# Patient Record
Sex: FEMALE | Race: WHITE | NOT HISPANIC OR LATINO | URBAN - METROPOLITAN AREA
[De-identification: names, ages, dates, MRNs, and addresses within clinical notes are randomized per-mention and may not be internally consistent; named-entity substitution may affect disease eponyms.]

---

## 2017-05-19 ENCOUNTER — OUTPATIENT (OUTPATIENT)
Dept: OUTPATIENT SERVICES | Facility: HOSPITAL | Age: 67
LOS: 1 days | Discharge: HOME | End: 2017-05-19

## 2017-06-28 DIAGNOSIS — S83.222A PERIPHERAL TEAR OF MEDIAL MENISCUS, CURRENT INJURY, LEFT KNEE, INITIAL ENCOUNTER: ICD-10-CM

## 2018-03-02 ENCOUNTER — INPATIENT (INPATIENT)
Facility: HOSPITAL | Age: 68
LOS: 4 days | Discharge: HOME | End: 2018-03-07
Attending: INTERNAL MEDICINE

## 2018-03-02 VITALS
RESPIRATION RATE: 20 BRPM | TEMPERATURE: 96 F | OXYGEN SATURATION: 96 % | HEART RATE: 90 BPM | SYSTOLIC BLOOD PRESSURE: 166 MMHG | DIASTOLIC BLOOD PRESSURE: 107 MMHG

## 2018-03-02 DIAGNOSIS — Z93.3 COLOSTOMY STATUS: Chronic | ICD-10-CM

## 2018-03-02 DIAGNOSIS — K94.09 OTHER COMPLICATIONS OF COLOSTOMY: Chronic | ICD-10-CM

## 2018-03-02 LAB
ALBUMIN SERPL ELPH-MCNC: 3.7 G/DL — SIGNIFICANT CHANGE UP (ref 3–5.5)
ALP SERPL-CCNC: 74 U/L — SIGNIFICANT CHANGE UP (ref 30–115)
ALT FLD-CCNC: 122 U/L — HIGH (ref 0–41)
ANION GAP SERPL CALC-SCNC: 10 MMOL/L — SIGNIFICANT CHANGE UP (ref 7–14)
AST SERPL-CCNC: 90 U/L — HIGH (ref 0–41)
B-TYPE NATRIURETIC PEPTIDE BNP RESULT: 980 PG/ML — HIGH (ref 0–99)
BASOPHILS # BLD AUTO: 0.01 K/UL — SIGNIFICANT CHANGE UP (ref 0–0.2)
BASOPHILS NFR BLD AUTO: 0.1 % — SIGNIFICANT CHANGE UP (ref 0–1)
BILIRUB SERPL-MCNC: 1.6 MG/DL — HIGH (ref 0.2–1.2)
BUN SERPL-MCNC: 24 MG/DL — HIGH (ref 10–20)
CALCIUM SERPL-MCNC: 9.2 MG/DL — SIGNIFICANT CHANGE UP (ref 8.5–10.1)
CHLORIDE SERPL-SCNC: 105 MMOL/L — SIGNIFICANT CHANGE UP (ref 98–110)
CK MB CFR SERPL CALC: 4.5 NG/ML — SIGNIFICANT CHANGE UP (ref 0.6–6.3)
CO2 SERPL-SCNC: 27 MMOL/L — SIGNIFICANT CHANGE UP (ref 17–32)
CREAT SERPL-MCNC: 0.8 MG/DL — SIGNIFICANT CHANGE UP (ref 0.7–1.5)
D DIMER BLD IA.RAPID-MCNC: 378 NG/ML DDU — HIGH (ref 0–230)
EOSINOPHIL # BLD AUTO: 0.02 K/UL — SIGNIFICANT CHANGE UP (ref 0–0.7)
EOSINOPHIL NFR BLD AUTO: 0.1 % — SIGNIFICANT CHANGE UP (ref 0–8)
GAS PNL BLDV: SIGNIFICANT CHANGE UP
GLUCOSE SERPL-MCNC: 123 MG/DL — HIGH (ref 70–110)
HCT VFR BLD CALC: 43.2 % — SIGNIFICANT CHANGE UP (ref 37–47)
HGB BLD-MCNC: 14.1 G/DL — SIGNIFICANT CHANGE UP (ref 12–16)
IMM GRANULOCYTES NFR BLD AUTO: 0.7 % — HIGH (ref 0.1–0.3)
LYMPHOCYTES # BLD AUTO: 1.95 K/UL — SIGNIFICANT CHANGE UP (ref 1.2–3.4)
LYMPHOCYTES # BLD AUTO: 13 % — LOW (ref 20.5–51.1)
MAGNESIUM SERPL-MCNC: 2 MG/DL — SIGNIFICANT CHANGE UP (ref 1.8–2.4)
MCHC RBC-ENTMCNC: 30 PG — SIGNIFICANT CHANGE UP (ref 27–31)
MCHC RBC-ENTMCNC: 32.6 G/DL — SIGNIFICANT CHANGE UP (ref 32–37)
MCV RBC AUTO: 91.9 FL — SIGNIFICANT CHANGE UP (ref 81–99)
MONOCYTES # BLD AUTO: 1.13 K/UL — HIGH (ref 0.1–0.6)
MONOCYTES NFR BLD AUTO: 7.5 % — SIGNIFICANT CHANGE UP (ref 1.7–9.3)
NEUTROPHILS # BLD AUTO: 11.79 K/UL — HIGH (ref 1.4–6.5)
NEUTROPHILS NFR BLD AUTO: 78.6 % — HIGH (ref 42.2–75.2)
PLATELET # BLD AUTO: 243 K/UL — SIGNIFICANT CHANGE UP (ref 130–400)
POTASSIUM SERPL-MCNC: 4.1 MMOL/L — SIGNIFICANT CHANGE UP (ref 3.5–5)
POTASSIUM SERPL-SCNC: 4.1 MMOL/L — SIGNIFICANT CHANGE UP (ref 3.5–5)
PROT SERPL-MCNC: 6.1 G/DL — SIGNIFICANT CHANGE UP (ref 6–8)
RBC # BLD: 4.7 M/UL — SIGNIFICANT CHANGE UP (ref 4.2–5.4)
RBC # FLD: 13.6 % — SIGNIFICANT CHANGE UP (ref 11.5–14.5)
SODIUM SERPL-SCNC: 142 MMOL/L — SIGNIFICANT CHANGE UP (ref 135–146)
TROPONIN I SERPL-MCNC: 0.03 NG/ML — SIGNIFICANT CHANGE UP (ref 0–0.05)
WBC # BLD: 15.01 K/UL — HIGH (ref 4.8–10.8)
WBC # FLD AUTO: 15.01 K/UL — HIGH (ref 4.8–10.8)

## 2018-03-02 RX ORDER — FUROSEMIDE 40 MG
20 TABLET ORAL ONCE
Qty: 0 | Refills: 0 | Status: COMPLETED | OUTPATIENT
Start: 2018-03-02 | End: 2018-03-02

## 2018-03-02 RX ORDER — ENOXAPARIN SODIUM 100 MG/ML
60 INJECTION SUBCUTANEOUS ONCE
Qty: 0 | Refills: 0 | Status: COMPLETED | OUTPATIENT
Start: 2018-03-02 | End: 2018-03-02

## 2018-03-02 RX ORDER — FUROSEMIDE 40 MG
40 TABLET ORAL DAILY
Qty: 0 | Refills: 0 | Status: DISCONTINUED | OUTPATIENT
Start: 2018-03-02 | End: 2018-03-03

## 2018-03-02 RX ORDER — FUROSEMIDE 40 MG
40 TABLET ORAL ONCE
Qty: 0 | Refills: 0 | Status: COMPLETED | OUTPATIENT
Start: 2018-03-02 | End: 2018-03-02

## 2018-03-02 RX ADMIN — Medication 40 MILLIGRAM(S): at 17:40

## 2018-03-02 RX ADMIN — ENOXAPARIN SODIUM 60 MILLIGRAM(S): 100 INJECTION SUBCUTANEOUS at 21:58

## 2018-03-02 RX ADMIN — Medication 20 MILLIGRAM(S): at 17:07

## 2018-03-02 RX ADMIN — Medication 2.5 MILLIGRAM(S): at 21:58

## 2018-03-02 NOTE — ED PROVIDER NOTE - MEDICAL DECISION MAKING DETAILS
see attestation.  Patient in new onset A Fib with CHF.  Appropriate labs sent, will start diuresis.  Patient will require tele admission for new onset A Fib and CHF with pulmonary edema.

## 2018-03-02 NOTE — ED PROVIDER NOTE - NS ED ROS FT
Constitutional: See HPI.  Eyes: No visual changes, eye pain or discharge.  ENMT: No hearing changes, pain, discharge or infections. No neck pain or stiffness.  Cardiac: No chest pain, No chest pain with exertion.  Respiratory: No cough or respiratory distress.   GI: No nausea, vomiting, diarrhea or abdominal pain.  : No dysuria, frequency or burning.  MS: No myalgia, muscle weakness, joint pain or back pain.  Neuro: No headache or weakness. No LOC.  Skin: No skin rash.

## 2018-03-02 NOTE — H&P ADULT - ASSESSMENT
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- last dose of prednisone today    4) HCM  dvt ppx on therapeutic lovenox  diet- low na diet  dispo- home 67y f with pmh of perforated diverticulitis s/p hartmans s/p reversal, galileo galileo disease p/w leg swelling and abd pain was found to have newly diagnosed afib and new onset chf    1) newly diagnosed afib-   admit to telemetry  rate control-give cardizem 15mg iv and then start cardizem 30mg q6h  chadsvasc-4, needs anticoagulation, will start lovenox for now  cardiology c/s    2) new onset chf-   c/w lasix 20mg iv  monitor i&o, daily weights  check ce x2 and echo  repeat cxr in am  add acei    3) 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- last dose of prednisone today    4) HCM  dvt ppx on therapeutic lovenox  diet- low na diet  dispo- home 67y f with pmh of perforated diverticulitis s/p hartmans s/p reversal, galileo galileo disease p/w leg swelling and abd pain was found to have newly diagnosed afib and new onset chf    1) newly diagnosed afib-   admit to telemetry  rate control-give cardizem 15mg iv and then start cardizem 30mg q6h  chadsvasc-4, needs anticoagulation, will start lovenox for now  cardiology c/s    2) new onset chf-   c/w lasix 20mg iv  monitor i&o, daily weights  check ce x2 and echo  repeat cxr in am  add acei    3) 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

## 2018-03-02 NOTE — ED PROVIDER NOTE - OBJECTIVE STATEMENT
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 Patient describes feeling unwell for the past 1 week since starting cephalexin for skin rash. Today palpations started prompting her to come into ED. Denies fevers, chills, cough, headache. Associated with leg swelling.

## 2018-03-02 NOTE — H&P ADULT - ATTENDING COMMENTS
seen and examined independently. agree with the above resident's note except as documented below.     67 yr old female with h/o galileo-galileo disease, hartmans procedure p/w 1 week h/o leg swelling, intermittent epigastric pressure type pain and palpitations after she was started on keflex and prednisone for skin rash. denies chest pain/fever/chills/vomiting/diarrhea. pt also c/o orthopnea. pt was found to have new onset a.flutter and chf in ED. currently pt is asymptomatic. on exam, +bibasilar crackles, s1s2 irregular tachy, no LE edema, abd soft non tender, blood work and imaging reviewed. unable to find ekg in chart. On telemetry-pt in a.flutter with variable block with vent rate around 110.  A: new onset  CHF and Atrial flutter (chadsvasc-3)     h/o galileo-galileo disease  P: cont. cardizem for rate control, iv lasix, monitor Is &Os, check TTE, thyroid profile, tele monitoring, repeat Ce and ECG, therapeutic lovenox.    cardiology consult. seen and examined independently. agree with the above resident's note except as documented below.     67 yr old female with h/o galileo-galileo disease, hartmans procedure p/w 1 week h/o leg swelling, intermittent epigastric pressure type pain and palpitations after she was started on keflex and prednisone for skin rash. denies chest pain/fever/chills/vomiting/diarrhea. pt also c/o orthopnea. pt was found to have new onset a.flutter and chf in ED. currently pt is asymptomatic. on exam, +bibasilar crackles, s1s2 irregular tachy, no LE edema, abd soft non tender. blood work and imaging reviewed. unable to find ekg in chart. On telemetry-pt in a.flutter with variable block with ventr. rate around 110.  A: new onset  CHF and Atrial flutter (chadsvasc-3)     h/o galileo-galileo disease  P: cont. cardizem for rate control, iv lasix, monitor Is &Os, check TTE, thyroid profile, tele monitoring, repeat Ce and ECG, therapeutic lovenox. cardiology consult.

## 2018-03-02 NOTE — H&P ADULT - NSHPREVIEWOFSYSTEMS_GEN_ALL_CORE
REVIEW OF SYSTEMS:    CONSTITUTIONAL: No weakness, fevers or chills  EYES/ENT: No visual changes;  No vertigo or throat pain   NECK: No pain or stiffness  RESPIRATORY: see hpi  CARDIOVASCULAR: see hpi  GASTROINTESTINAL: No abdominal or epigastric pain. No nausea, vomiting, or hematemesis; No diarrhea or constipation. No melena or hematochezia.  GENITOURINARY: No dysuria, frequency or hematuria  NEUROLOGICAL: No numbness or weakness  SKIN: No itching, rashes REVIEW OF SYSTEMS:    CONSTITUTIONAL: No weakness, fevers or chills  EYES/ENT: No visual changes;  No vertigo or throat pain   NECK: No pain or stiffness  RESPIRATORY: see hpi  CARDIOVASCULAR: see hpi  GASTROINTESTINAL: No nausea, vomiting, or hematemesis; No diarrhea or constipation. No melena or hematochezia.  GENITOURINARY: No dysuria, frequency or hematuria  NEUROLOGICAL: No numbness or weakness  SKIN: see hpi

## 2018-03-02 NOTE — H&P ADULT - HISTORY OF PRESENT ILLNESS
67 y f with pmh of perforated diverticulitis s/p quick procedure and reversal in 2010 came in with abd pain and leg swelling. 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 On 2nd day, she started noticing leg swelling and stopped taking keflex. she also c/o abd pain which is pressure like, non radiating, intermittent along with palpitations, orthopnea but denies any cough, sob, n/v, change in bowel or urinary habits, fever, chills, weakness. In ED, she was found to be newly diagnosed afib and was given cardizem 15mg iv and 30mg po along with lasix 20mg iv. As per pt, today is her last dose of prednisone 5mg.

## 2018-03-02 NOTE — H&P ADULT - NSHPPHYSICALEXAM_GEN_ALL_CORE
PHYSICAL EXAM:      Constitutional: well developed and well nourished    Respiratory: b/l bases crackles    Cardiovascular: S1S2 irregular, tachycardic, no murmur heard    Gastrointestinal: Abd soft, non distended, non tender, BS+, midline scar    Extremities: 2+ pedal edema b/l    Neurological: AAOX3, No FND PHYSICAL EXAM:  aaox3    Constitutional: well developed and well nourished    Respiratory: b/l bases crackles    Cardiovascular: S1S2 irregular, tachycardic, no murmur heard    Gastrointestinal: Abd soft, non distended, non tender, BS+, midline scar    Extremities: 2+ pedal edema b/l    Neurological: AAOX3, No FND    neck: supple    HEENT: no pallor

## 2018-03-02 NOTE — ED PROVIDER NOTE - PHYSICAL EXAMINATION
AOx4, Non toxic appearing, NAD, speaking in full sentences. Skin  warm and dry, no acute rash. Head normocephalic, atraumatic. PERRLA/EOMI, conjunctiva and sclera clear. MM moist, no nasal discharge.  Pharynx and TM's unremarkable.  No mastoid or temporal ttp. Neck supple nt, no meningeal signs. Heart RRR s1s2 nl, no rub/murmur. Lungs- No retractions, crackles b/l in lower lung fields. Abdomen soft ntnd no r/g. Extremities- moves all, +equal distal pulses, brisk cap refill, sensation wnl, normal ROM. +LE edema,

## 2018-03-02 NOTE — ED PROVIDER NOTE - ATTENDING CONTRIBUTION TO CARE
Patient presents with palpitations and strange feeling in her epigastrium.  This started days ago after starting Keflex.  Patient in A Flutter with RVR. Patient has crackles to b/l bases and peripheral pitting edema.  No prior cardiac history. Rate Control given.  Patient CHADS2-Vasc considered.  Will check labs including D-dimer, CXR, reassess.  Patient will require anticoagulation and admission.

## 2018-03-03 LAB
CK MB BLD-MCNC: 5 % — HIGH (ref 0–4)
CK MB CFR SERPL CALC: 2.7 NG/ML — SIGNIFICANT CHANGE UP (ref 0.6–6.3)
CK MB CFR SERPL CALC: 2.8 NG/ML — SIGNIFICANT CHANGE UP (ref 0.6–6.3)
CK SERPL-CCNC: 53 U/L — SIGNIFICANT CHANGE UP (ref 0–225)
CK SERPL-CCNC: 56 U/L — SIGNIFICANT CHANGE UP (ref 0–225)
T3 SERPL-MCNC: 103 NG/DL — SIGNIFICANT CHANGE UP (ref 80–200)
T4 AB SER-ACNC: 8.1 UG/DL — SIGNIFICANT CHANGE UP (ref 4.6–12)
TROPONIN I SERPL-MCNC: 0.04 NG/ML — SIGNIFICANT CHANGE UP (ref 0–0.05)
TROPONIN I SERPL-MCNC: 0.04 NG/ML — SIGNIFICANT CHANGE UP (ref 0–0.05)
TSH SERPL-MCNC: 4.96 UIU/ML — HIGH (ref 0.27–4.2)

## 2018-03-03 RX ORDER — FUROSEMIDE 40 MG
40 TABLET ORAL DAILY
Qty: 0 | Refills: 0 | Status: DISCONTINUED | OUTPATIENT
Start: 2018-03-03 | End: 2018-03-07

## 2018-03-03 RX ORDER — APIXABAN 2.5 MG/1
2.5 TABLET, FILM COATED ORAL EVERY 12 HOURS
Qty: 0 | Refills: 0 | Status: DISCONTINUED | OUTPATIENT
Start: 2018-03-03 | End: 2018-03-03

## 2018-03-03 RX ORDER — ENOXAPARIN SODIUM 100 MG/ML
60 INJECTION SUBCUTANEOUS
Qty: 0 | Refills: 0 | Status: DISCONTINUED | OUTPATIENT
Start: 2018-03-03 | End: 2018-03-03

## 2018-03-03 RX ORDER — APIXABAN 2.5 MG/1
5 TABLET, FILM COATED ORAL EVERY 12 HOURS
Qty: 0 | Refills: 0 | Status: DISCONTINUED | OUTPATIENT
Start: 2018-03-03 | End: 2018-03-07

## 2018-03-03 RX ORDER — ENOXAPARIN SODIUM 100 MG/ML
60 INJECTION SUBCUTANEOUS EVERY 12 HOURS
Qty: 0 | Refills: 0 | Status: DISCONTINUED | OUTPATIENT
Start: 2018-03-03 | End: 2018-03-03

## 2018-03-03 RX ADMIN — Medication 2.5 MILLIGRAM(S): at 06:00

## 2018-03-03 RX ADMIN — APIXABAN 5 MILLIGRAM(S): 2.5 TABLET, FILM COATED ORAL at 18:12

## 2018-03-03 RX ADMIN — Medication 40 MILLIGRAM(S): at 05:59

## 2018-03-03 NOTE — CONSULT NOTE ADULT - ASSESSMENT
68 y/o F was referred from Urgent care for A Fib on EKG. Patient with palpitation for 3 days    Assessment:    1.	Ne onset Uncontrolled A Fib  2.	Acute CHF mostly diastolic secondary to uncontrolled A fib    Plan:    1.	Increase Diltiazem to 60mg PO q6h  2.	Change Lovenox to Eliquis   3.	d/c Enalapril  4.	Change Lasix IV to 40mg PO daily  5.	check thyroid function  6.	OUMOU & Cardioversion on Monday 66 y/o F was referred from Urgent care for A Fib on EKG. Patient with palpitation for 3 days    Assessment:    1.	Ne onset Uncontrolled A Fib  2.	Acute CHF mostly diastolic secondary to uncontrolled A fib    Plan:    1.	Increase Diltiazem to 60mg PO q6h  2.	Change Lovenox to Eliquis   3.	Cont Enalapril  4.	Change Lasix IV to 40mg PO daily  5.	check thyroid function  6.	OUMOU & Cardioversion on Monday

## 2018-03-03 NOTE — PROGRESS NOTE ADULT - SUBJECTIVE AND OBJECTIVE BOX
SUBJECTIVE:    Patient is a 67y old Female who presents with a chief complaint of Afib with RVR and new onset CHF (02 Mar 2018 20:03)    Currently admitted to medicine with the primary diagnosis of Atrial fibrillation with RVR     Today is hospital day 1d. Stable overnight. HR fluctuates with activity/laying down.      PAST MEDICAL & SURGICAL HISTORY  Perforated diverticulum  Retraction of colostomy  Status post Claritza procedure    SOCIAL HISTORY:  Negative for smoking/alcohol/drug use.     ALLERGIES:  No Known Allergies    MEDICATIONS:  STANDING MEDICATIONS  apixaban 5 milliGRAM(s) Oral every 12 hours  diltiazem    Tablet 60 milliGRAM(s) Oral every 6 hours  enalapril 2.5 milliGRAM(s) Oral daily  furosemide   Injectable 40 milliGRAM(s) IV Push daily    PRN MEDICATIONS    VITALS:   T(F): 97.1  HR: 152  BP: 138/90  RR: 18  SpO2: 97%    LABS:                        14.1   15.01 )-----------( 243      ( 02 Mar 2018 14:45 )             43.2     03-02    142  |  105  |  24<H>  ----------------------------<  123<H>  4.1   |  27  |  0.8    Ca    9.2      02 Mar 2018 14:45  Mg     2.0     03-02    TPro  6.1  /  Alb  3.7  /  TBili  1.6<H>  /  DBili  x   /  AST  90<H>  /  ALT  122<H>  /  AlkPhos  74  03-02          Troponin I, Serum: 0.04 ng/mL (03-03-18 @ 07:21)  Creatine Kinase, Serum: 53 U/L (03-03-18 @ 02:05)  Troponin I, Serum: 0.04 ng/mL (03-03-18 @ 02:05)  Troponin I, Serum: 0.03 ng/mL (03-02-18 @ 14:45)      CARDIAC MARKERS ( 03 Mar 2018 07:21 )  0.04 ng/mL / x     / x     / x     / 2.7 ng/mL  CARDIAC MARKERS ( 03 Mar 2018 02:05 )  0.04 ng/mL / x     / 53 U/L / x     / 2.8 ng/mL  CARDIAC MARKERS ( 02 Mar 2018 14:45 )  0.03 ng/mL / x     / x     / x     / 4.5 ng/mL      RADIOLOGY:  < from: CT Chest w/ IV Cont (03.02.18 @ 17:27) >    IMPRESSION:    Limited exam given history motion. No central pulmonary embolus.    Bilateral, right greater than left pleural effusions correlating with   chest radiograph findings from the same day.    < end of copied text >    PHYSICAL EXAM:  GEN:   LUNGS:    HEART:   ABD:   EXT:   NEURO: SUBJECTIVE:    Patient is a 67y old Female who presents with a chief complaint of Afib with RVR and new onset CHF (02 Mar 2018 20:03)    Currently admitted to medicine with the primary diagnosis of Atrial fibrillation with RVR     Today is hospital day 1d. Stable overnight. HR fluctuates with activity/laying down. Feels much better.     PAST MEDICAL & SURGICAL HISTORY  Perforated diverticulum  Retraction of colostomy  Status post Claritza procedure    SOCIAL HISTORY:  Negative for smoking/alcohol/drug use.     ALLERGIES:  No Known Allergies    MEDICATIONS:  STANDING MEDICATIONS  apixaban 5 milliGRAM(s) Oral every 12 hours  diltiazem    Tablet 60 milliGRAM(s) Oral every 6 hours  enalapril 2.5 milliGRAM(s) Oral daily  furosemide   Injectable 40 milliGRAM(s) IV Push daily    PRN MEDICATIONS    VITALS:   T(F): 97.1  HR: 152  BP: 138/90  RR: 18  SpO2: 97%    LABS:                        14.1   15.01 )-----------( 243      ( 02 Mar 2018 14:45 )             43.2     03-02    142  |  105  |  24<H>  ----------------------------<  123<H>  4.1   |  27  |  0.8    Ca    9.2      02 Mar 2018 14:45  Mg     2.0     03-02    TPro  6.1  /  Alb  3.7  /  TBili  1.6<H>  /  DBili  x   /  AST  90<H>  /  ALT  122<H>  /  AlkPhos  74  03-02          Troponin I, Serum: 0.04 ng/mL (03-03-18 @ 07:21)  Creatine Kinase, Serum: 53 U/L (03-03-18 @ 02:05)  Troponin I, Serum: 0.04 ng/mL (03-03-18 @ 02:05)  Troponin I, Serum: 0.03 ng/mL (03-02-18 @ 14:45)      CARDIAC MARKERS ( 03 Mar 2018 07:21 )  0.04 ng/mL / x     / x     / x     / 2.7 ng/mL  CARDIAC MARKERS ( 03 Mar 2018 02:05 )  0.04 ng/mL / x     / 53 U/L / x     / 2.8 ng/mL  CARDIAC MARKERS ( 02 Mar 2018 14:45 )  0.03 ng/mL / x     / x     / x     / 4.5 ng/mL      RADIOLOGY:  < from: CT Chest w/ IV Cont (03.02.18 @ 17:27) >    IMPRESSION:    Limited exam given history motion. No central pulmonary embolus.    Bilateral, right greater than left pleural effusions correlating with   chest radiograph findings from the same day.    < end of copied text >    PHYSICAL EXAM:  GEN: no acute distress, son at bedside  LUNGS: bibasilar crackles, no wheeze   HEART: rapid, irregular,   ABD: soft, non tender, +BS  EXT: no edema, B/L pulses present  NEURO: AOX3

## 2018-03-03 NOTE — CONSULT NOTE ADULT - SUBJECTIVE AND OBJECTIVE BOX
Cardiology Consultation    CHIEF COMPLAINT: Patient is a 67y old  Female who presents with a chief complaint of Afib with RVR and new onset CHF (02 Mar 2018 20:03)      HPI:  67 y f with pmh of perforated diverticulitis s/p quick procedure and reversal in 2010 came in with abd pain and leg swelling. 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 On 2nd day, she started noticing leg swelling and stopped taking keflex. she also c/o abd pain which is pressure like, non radiating, intermittent along with palpitations, orthopnea but denies any cough, sob, n/v, change in bowel or urinary habits, fever, chills, weakness. In ED, she was found to be newly diagnosed afib and was given cardizem 15mg iv and 30mg po along with lasix 20mg iv. As per pt, today is her last dose of prednisone 5mg. (02 Mar 2018 19:15)      PAST MEDICAL & SURGICAL HISTORY:  Perforated diverticulum  Retraction of colostomy  Status post Claritza procedure      SOCIAL HISTORY: Non smoker, no etoh or drug abuse    FAMILY HISTORY: FAMILY HISTORY:  Family history of cancer (Father)      Home Medications:  predniSONE 5 mg oral tablet: 1 tab(s) orally once a day (02 Mar 2018 19:27)      MEDICATIONS  (STANDING):  diltiazem    Tablet 30 milliGRAM(s) Oral every 6 hours  enalapril 2.5 milliGRAM(s) Oral daily  enoxaparin Injectable 60 milliGRAM(s) SubCutaneous every 12 hours  furosemide   Injectable 40 milliGRAM(s) IV Push daily    MEDICATIONS  (PRN):      Allergies    No Known Allergies      REVIEW OF SYSTEMS:    All other review of systems is negative unless indicated above    VITAL SIGNS:   Vital Signs Last 24 Hrs  T(C): 35.7 (02 Mar 2018 21:08), Max: 36.2 (02 Mar 2018 14:51)  T(F): 96.2 (02 Mar 2018 21:08), Max: 97.1 (02 Mar 2018 14:51)  HR: 130 (02 Mar 2018 21:08) (90 - 136)  BP: 146/94 (02 Mar 2018 21:08) (146/94 - 177/115)  BP(mean): --  RR: 16 (02 Mar 2018 21:08) (16 - 20)  SpO2: 97% (03 Mar 2018 02:52) (95% - 97%)    I&O's Summary      PHYSICAL EXAM:    Constitutional: NAD, awake and alert, well-developed  Pulmonary: Non-labored, breath sounds are clear bilaterally, No wheezing, rales or rhonchi  Cardiovascular: PMI not palpable non-displaced Regular S1 and S2, no murmurs, rubs, gallops or clicks  Gastrointestinal: Bowel Sounds present, soft, nontender.   Neurological: Alert, no focal deficits  LE: no edema      LABS: All Labs Reviewed:                        14.1   15.01 )-----------( 243      ( 02 Mar 2018 14:45 )             43.2     02 Mar 2018 14:45    142    |  105    |  24     ----------------------------<  123    4.1     |  27     |  0.8      Ca    9.2        02 Mar 2018 14:45  Mg     2.0       02 Mar 2018 14:45    TPro  6.1    /  Alb  3.7    /  TBili  1.6    /  DBili  x      /  AST  90     /  ALT  122    /  AlkPhos  74     02 Mar 2018 14:45      CARDIAC MARKERS ( 03 Mar 2018 02:05 )  0.04 ng/mL / x     / 53 U/L / x     / 2.8 ng/mL  CARDIAC MARKERS ( 02 Mar 2018 14:45 )  0.03 ng/mL / x     / x     / x     / 4.5 ng/mL            RADIOLOGY/EKG:  < from: CT Chest w/ IV Cont (03.02.18 @ 17:27) >  Limited exam given history motion. No central pulmonary embolus.    Bilateral, right greater than left pleural effusions correlating with   chest radiograph findings from the same day.    < end of copied text >

## 2018-03-03 NOTE — PROGRESS NOTE ADULT - ASSESSMENT
67y f with pmh of perforated diverticulitis s/p hartmans s/p reversal, galileo galileo disease p/w leg swelling and abd pain was found to have newly diagnosed afib and new onset chf    1) New onset a. fibrillation--CHADSVASC 3  -continue telemetry   Cardiology: started on cardizem 30q6h, adjusted to 60q6h as pt was not controlled  Anti-coagulation: initially started on lovenox, switched to eliquis 5mg BID   Thyroid studies pending     2) new onset CHF-   Initially on lasix 40mg IV-- adjust to 40mg PO as per cardiology  i&o, daily weights   CE negative   CXR: Findings suspicious for CHF. 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 fibrillation--CHADSVASC 3  -continue telemetry   Cardiology: started on cardizem 30q6h, adjusted to 60q6h as pt was not controlled  Anti-coagulation: initially started on lovenox, switched to eliquis 5mg BID   Thyroid studies pending   Possible cardioversion on Monday     2) new onset CHF-   Initially on lasix 40mg IV-- adjust to 40mg PO as per cardiology  i&o, daily weights   CE negative   CXR: Findings suspicious for CHF. 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 Pt does not require daily prednisone.     4) leukocytosis- patient just completed prednisone for skin disease    5) HCM  dvt ppx on eliquis   Low Na diet   dispo- home

## 2018-03-03 NOTE — CHART NOTE - NSCHARTNOTEFT_GEN_A_CORE
Patient is new onset A.fib. Patient HR is running in 90 when sleeping, and however goes up to 140-160 when awake.   Spoke with Cardio fellow, recommends to increased cardizem 30mg q6h to cardizem 60mg q6h. Patient is new onset A.fib. Patient HR is running in 90 when sleeping, and however goes up to 140-160 when awake.   Patient is asymptomatic during A.fib RVR, with stable BP.   Spoke with Cardio fellow, recommends to increased cardizem 30mg q6h to cardizem 60mg q6h.

## 2018-03-04 LAB
ANION GAP SERPL CALC-SCNC: 4 MMOL/L — LOW (ref 7–14)
BUN SERPL-MCNC: 12 MG/DL — SIGNIFICANT CHANGE UP (ref 10–20)
CALCIUM SERPL-MCNC: 8.8 MG/DL — SIGNIFICANT CHANGE UP (ref 8.5–10.1)
CHLORIDE SERPL-SCNC: 104 MMOL/L — SIGNIFICANT CHANGE UP (ref 98–110)
CO2 SERPL-SCNC: 33 MMOL/L — HIGH (ref 17–32)
CREAT SERPL-MCNC: 0.9 MG/DL — SIGNIFICANT CHANGE UP (ref 0.7–1.5)
GLUCOSE SERPL-MCNC: 89 MG/DL — SIGNIFICANT CHANGE UP (ref 70–110)
HCT VFR BLD CALC: 40 % — SIGNIFICANT CHANGE UP (ref 37–47)
HGB BLD-MCNC: 13.4 G/DL — SIGNIFICANT CHANGE UP (ref 12–16)
MCHC RBC-ENTMCNC: 30.5 PG — SIGNIFICANT CHANGE UP (ref 27–31)
MCHC RBC-ENTMCNC: 33.5 G/DL — SIGNIFICANT CHANGE UP (ref 32–37)
MCV RBC AUTO: 91.1 FL — SIGNIFICANT CHANGE UP (ref 81–99)
NRBC # BLD: 0 /100 WBCS — SIGNIFICANT CHANGE UP (ref 0–0)
PLATELET # BLD AUTO: 196 K/UL — SIGNIFICANT CHANGE UP (ref 130–400)
POTASSIUM SERPL-MCNC: 3.9 MMOL/L — SIGNIFICANT CHANGE UP (ref 3.5–5)
POTASSIUM SERPL-SCNC: 3.9 MMOL/L — SIGNIFICANT CHANGE UP (ref 3.5–5)
RBC # BLD: 4.39 M/UL — SIGNIFICANT CHANGE UP (ref 4.2–5.4)
RBC # FLD: 13.2 % — SIGNIFICANT CHANGE UP (ref 11.5–14.5)
SODIUM SERPL-SCNC: 141 MMOL/L — SIGNIFICANT CHANGE UP (ref 135–146)
WBC # BLD: 9.04 K/UL — SIGNIFICANT CHANGE UP (ref 4.8–10.8)
WBC # FLD AUTO: 9.04 K/UL — SIGNIFICANT CHANGE UP (ref 4.8–10.8)

## 2018-03-04 RX ADMIN — APIXABAN 5 MILLIGRAM(S): 2.5 TABLET, FILM COATED ORAL at 18:27

## 2018-03-04 RX ADMIN — Medication 40 MILLIGRAM(S): at 06:04

## 2018-03-04 RX ADMIN — APIXABAN 5 MILLIGRAM(S): 2.5 TABLET, FILM COATED ORAL at 06:03

## 2018-03-04 NOTE — PROGRESS NOTE ADULT - ASSESSMENT
67y f with pmh of perforated diverticulitis s/p hartmans s/p reversal, galileo galileo disease p/w leg swelling and abd pain was found to have newly diagnosed afib and new onset chf    1) New onset a. fibrillation--CHADSVASC 3  -continue telemetry   Cardiology: started on cardizem 30q6h, adjusted to 60q6h as pt was not controlled  Anti-coagulation: initially started on lovenox, switched to eliquis 5mg BID   Thyroid studies : TSH 4.96  Possible cardioversion on Monday     2) new onset CHF ? Diastolic Vs Systolic   Initially on lasix 40mg IV-- adjust to 40mg PO as per cardiology  i&o, daily weights   CE negative   CXR: Findings suspicious for CHF. 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 Pt does not require daily prednisone.     4) leukocytosis- patient just completed prednisone for skin disease    5) HCM  dvt ppx on eliquis   Low Na diet   dispo- home

## 2018-03-04 NOTE — PROGRESS NOTE ADULT - SUBJECTIVE AND OBJECTIVE BOX
INTERVAL HISTORY: No overnight events.   still in a.fib, for david/cv tomorrow  echo still pending  	  MEDICATIONS:  apixaban 5 milliGRAM(s) Oral every 12 hours  diltiazem    Tablet 60 milliGRAM(s) Oral every 6 hours  furosemide    Tablet 40 milliGRAM(s) Oral daily        PHYSICAL EXAM:  T(C): 37.3 (03-04-18 @ 12:47), Max: 37.3 (03-04-18 @ 12:47)  HR: 95 (03-04-18 @ 12:47) (95 - 108)  BP: 151/92 (03-04-18 @ 12:47) (123/84 - 151/92)  RR: 18 (03-04-18 @ 12:47) (18 - 18)  SpO2: --  Wt(kg): --  I&O's Summary        GENERAL: NAD, well-groomed, well-developed  HEAD:  Atraumatic, Normocephalic  NECK: Supple, No JVD, Normal thyroid  NERVOUS SYSTEM:  Alert & Oriented X3, Good concentration  CHEST/LUNG: Clear to percussion bilaterally; No rales, rhonchi, wheezing, or rubs  HEART: Irregular, 2/6 syst m+, no rubs, or gallops  ABDOMEN: Soft, Nontender, Nondistended; Bowel sounds present  EXTREMITIES:  No clubbing, cyanosis, or edema  SKIN: No rashes or lesions    LABS:	 	    CARDIAC MARKERS ( 03 Mar 2018 07:21 )  0.04 ng/mL / x     / 56 U/L / x     / 2.7 ng/mL  CARDIAC MARKERS ( 03 Mar 2018 02:05 )  0.04 ng/mL / x     / 53 U/L / x     / 2.8 ng/mL                            13.4   9.04  )-----------( 196      ( 04 Mar 2018 06:03 )             40.0     03-04    141  |  104  |  12  ----------------------------<  89  3.9   |  33<H>  |  0.9    Ca    8.8      04 Mar 2018 06:03      A/P  persistent a.fib  chf    cont meds  increase ccb for better rate control and bp mx  keep npo for david/cv zander  obtain 2d echo

## 2018-03-04 NOTE — PROGRESS NOTE ADULT - SUBJECTIVE AND OBJECTIVE BOX
KARAN LEMONS  67y  Female      Patient is a 67y old  Female who presents with a chief complaint of Afib with RVR and new onset CHF (02 Mar 2018 20:03)      INTERVAL HPI/OVERNIGHT EVENTS:      ******************************* REVIEW OF SYSTEMS:**********************************************    CONSTITUTIONAL: No fever, weight loss, or fatigue  RESPIRATORY: No cough, wheezing, chills or hemoptysis; No shortness of breath  CARDIOVASCULAR: No chest pain, palpitations, dizziness, or leg swelling  GASTROINTESTINAL: No abdominal or epigastric pain. No nausea, vomiting, or hematemesis; No diarrhea or constipation. No melena or hematochezia.  GENITOURINARY: No dysuria, frequency, hematuria, or incontinence  NEUROLOGICAL: No headaches, memory loss, loss of strength, numbness, or tremors  SKIN: No itching, burning, rashes, or lesions   MUSCULOSKELETAL: No joint pain or swelling; No muscle, back, or extremity pain  PSYCHIATRIC: No depression, anxiety, mood swings, or difficulty sleeping    All other review of systems negative    *********************** VITALS ******************************************    T(F): 98 (03-04-18 @ 05:42)  HR: 108 (03-04-18 @ 05:42) (101 - 108)  BP: 139/85 (03-04-18 @ 05:42) (123/84 - 139/85)  RR: 18 (03-04-18 @ 05:42) (18 - 18)  SpO2: --            ******************************** PHYSICAL EXAM:**************************************************  GENERAL: NAD    PSYCH: no agitation, baseline mentation  HEENT:     NERVOUS SYSTEM:  Alert & Oriented X3, MS  5/5 B/L  UE and LE ; Sensory intact    PULMONARY: LUTHER, CTA    CARDIOVASCULAR: S1S2 irregular    GI: Soft, NT, ND; BS present.    EXTREMITIES:  2+ Peripheral Pulses, No clubbing, cyanosis, or edema    LYMPH: No lymphadenopathy noted    SKIN: No rashes or lesions    ******************************************************************************************    Consultant(s) Notes Reviewed:  [x ] YES  [ ] NO    Discussed with Consultants/Other Providers [ x] YES     **************************** LABS *******************************************************                          13.4   9.04  )-----------( 196      ( 04 Mar 2018 06:03 )             40.0     03-04    141  |  104  |  12  ----------------------------<  89  3.9   |  33<H>  |  0.9    Ca    8.8      04 Mar 2018 06:03  Mg     2.0     03-02    TPro  6.1  /  Alb  3.7  /  TBili  1.6<H>  /  DBili  x   /  AST  90<H>  /  ALT  122<H>  /  AlkPhos  74  03-02          Lactate Trend    CARDIAC MARKERS ( 03 Mar 2018 07:21 )  0.04 ng/mL / x     / 56 U/L / x     / 2.7 ng/mL  CARDIAC MARKERS ( 03 Mar 2018 02:05 )  0.04 ng/mL / x     / 53 U/L / x     / 2.8 ng/mL  CARDIAC MARKERS ( 02 Mar 2018 14:45 )  0.03 ng/mL / x     / x     / x     / 4.5 ng/mL      CAPILLARY BLOOD GLUCOSE              **************************Active Medications *******************************************  No Known Allergies      apixaban 5 milliGRAM(s) Oral every 12 hours  diltiazem    Tablet 60 milliGRAM(s) Oral every 6 hours  furosemide    Tablet 40 milliGRAM(s) Oral daily      ***************************************************  RADIOLOGY & ADDITIONAL TESTS:    Imaging Personally Reviewed:  [ ] YES  [ ] NO    HEALTH ISSUES - PROBLEM Dx:

## 2018-03-05 RX ORDER — METOPROLOL TARTRATE 50 MG
25 TABLET ORAL
Qty: 0 | Refills: 0 | Status: DISCONTINUED | OUTPATIENT
Start: 2018-03-05 | End: 2018-03-06

## 2018-03-05 RX ORDER — AMIODARONE HYDROCHLORIDE 400 MG/1
150 TABLET ORAL ONCE
Qty: 0 | Refills: 0 | Status: COMPLETED | OUTPATIENT
Start: 2018-03-05 | End: 2018-03-05

## 2018-03-05 RX ORDER — AMIODARONE HYDROCHLORIDE 400 MG/1
400 TABLET ORAL
Qty: 0 | Refills: 0 | Status: DISCONTINUED | OUTPATIENT
Start: 2018-03-06 | End: 2018-03-07

## 2018-03-05 RX ORDER — AMIODARONE HYDROCHLORIDE 400 MG/1
0.5 TABLET ORAL
Qty: 900 | Refills: 0 | Status: DISCONTINUED | OUTPATIENT
Start: 2018-03-05 | End: 2018-03-06

## 2018-03-05 RX ORDER — AMIODARONE HYDROCHLORIDE 400 MG/1
1 TABLET ORAL
Qty: 900 | Refills: 0 | Status: DISCONTINUED | OUTPATIENT
Start: 2018-03-05 | End: 2018-03-06

## 2018-03-05 RX ADMIN — AMIODARONE HYDROCHLORIDE 16.67 MG/MIN: 400 TABLET ORAL at 19:31

## 2018-03-05 RX ADMIN — AMIODARONE HYDROCHLORIDE 618 MILLIGRAM(S): 400 TABLET ORAL at 13:15

## 2018-03-05 RX ADMIN — APIXABAN 5 MILLIGRAM(S): 2.5 TABLET, FILM COATED ORAL at 17:27

## 2018-03-05 RX ADMIN — Medication 5 MILLIGRAM(S): at 17:29

## 2018-03-05 RX ADMIN — APIXABAN 5 MILLIGRAM(S): 2.5 TABLET, FILM COATED ORAL at 05:30

## 2018-03-05 RX ADMIN — AMIODARONE HYDROCHLORIDE 33.33 MG/MIN: 400 TABLET ORAL at 13:42

## 2018-03-05 RX ADMIN — Medication 25 MILLIGRAM(S): at 17:27

## 2018-03-05 RX ADMIN — Medication 40 MILLIGRAM(S): at 05:31

## 2018-03-05 NOTE — PROGRESS NOTE ADULT - SUBJECTIVE AND OBJECTIVE BOX
SUBJECTIVE:    Patient is a 67y old Female who presents with a chief complaint of Afib with RVR and new onset CHF (02 Mar 2018 20:03)    Currently admitted to medicine with the primary diagnosis of Atrial fibrillation with RVR     Today is hospital day 3d. This morning she is resting comfortably in bed and reports no new issues or overnight events.     PAST MEDICAL & SURGICAL HISTORY  Perforated diverticulum  Retraction of colostomy  Status post Claritza procedure    SOCIAL HISTORY:  Negative for smoking/alcohol/drug use.     ALLERGIES:  No Known Allergies    MEDICATIONS:  STANDING MEDICATIONS  apixaban 5 milliGRAM(s) Oral every 12 hours  diltiazem    Tablet 60 milliGRAM(s) Oral every 6 hours  furosemide    Tablet 40 milliGRAM(s) Oral daily    PRN MEDICATIONS    VITALS:   T(F): 98.4  HR: 90  BP: 142/83  RR: 18  SpO2: 98%    LABS:                        13.4   9.04  )-----------( 196      ( 04 Mar 2018 06:03 )             40.0     03-04    141  |  104  |  12  ----------------------------<  89  3.9   |  33<H>  |  0.9    Ca    8.8      04 Mar 2018 06:03                    RADIOLOGY:  < from: Xray Chest 1 View AP/PA (03.02.18 @ 20:47) >  Impression:      Stable bilateral opacities/effusions.    < end of copied text >    PHYSICAL EXAM:  GEN: No acute distress  LUNGS: bibasilar BS decreased, no crackles appreciated, no shortness of breath   HEART: S1/S2 present. rapid, irregular   ABD: Soft, non-tender, non-distended. Bowel sounds present  EXT: NC/NC/NE/2+PP/MOSS  NEURO: AAOX3

## 2018-03-05 NOTE — PROGRESS NOTE ADULT - SUBJECTIVE AND OBJECTIVE BOX
OUMOU Post Procedure Note:    After risks, benefits and alternatives of the procedure were explained, consent was signed and placed in the medical record.  Procedural timeout was taken.  Sedation was administered by anesthesia.  OUMOU probe inserted without complication and OUMOU performed.  Patient tolerated the procedure well without complication.  Post-procedure vital signs were stable.    Finding of the procedure discussed with the patient and referring cardiologist.    T(C): --  HR: 90 (03-05-18 @ 12:12)  BP: 142/83 (03-05-18 @ 12:12)  RR: --  SpO2: 98% (03-05-18 @ 12:12)    OUMOU findings:  LVEF 30-35%  Mild to Moderate MR  Trace TR  No WILBER thrombus  See full report for findings.    Cardioversion attempted X3 (200,300, 360).3rd attempt was after a dose of amiodaron(150 mg) Despite multiple attempts the patient continued to be in atrial fibrilation    EKG : Atrial Fibrillation at rate of  107  bpm.     Recommendations:  Continue all current medications including anticoagulant therapy. OUMOU Post Procedure Note:    After risks, benefits and alternatives of the procedure were explained, consent was signed and placed in the medical record.  Procedural timeout was taken.  Sedation was administered by anesthesia.  OUMOU probe inserted without complication and OUMOU performed.  Patient tolerated the procedure well without complication.  Post-procedure vital signs were stable.    Finding of the procedure discussed with the patient and referring cardiologist.    T(C): --  HR: 90 (03-05-18 @ 12:12)  BP: 142/83 (03-05-18 @ 12:12)  RR: --  SpO2: 98% (03-05-18 @ 12:12)    OUMOU findings:  LVEF 30-35%  Mild to Moderate MR  Trace TR  No WILBER thrombus  See full report for findings.    Cardioversion attempted X3 (200,300, 360).3rd attempt was after a dose of amiodaron(150 mg) Despite multiple attempts the patient continued to be in atrial fibrilation    EKG : Atrial Fibrillation at rate of  107  bpm.     Recommendations:  Continue all current medications including anticoagulant therapy.  Start IV amio for 24 hours and then change to po

## 2018-03-05 NOTE — PROGRESS NOTE ADULT - SUBJECTIVE AND OBJECTIVE BOX
KARAN LEMONS  67y Female    INTERVAL HPI/OVERNIGHT EVENTS:  Patient seen and examined. No palpitations. No SOB. No abd pain    ROS: All other systems are negative.    Vital Signs:    T(F): 98.4 (03-05-18 @ 05:00), Max: 99.2 (03-04-18 @ 12:47)  HR: 90 (03-05-18 @ 10:28) (70 - 95)  BP: 142/83 (03-05-18 @ 10:28) (123/89 - 154/92)  RR: 18 (03-05-18 @ 05:00) (18 - 18)  SpO2: 98% (03-05-18 @ 10:28) (98% - 98%)    PHYSICAL EXAM:    GENERAL: Alert & oriented X 3. NAD  SKIN: No rashes or lesions  HENT: Atrumatic. Normocephalic. PERRL. Moist membranes.  NECK: Supple, No JVD. No lymphadenopathy.  PULMONARY: CTA B/L. No wheezing. No rales  CVS: Normal S1, S2. Rate and Rythm are IRIR. No murmurs, rubs or gallops.  ABDOMEN: Soft, Nontender, Nondistended; Bowel sounds present  EXTREMITIES: Peripheral pulses intact. No edema B/L.  NEUROLOGIC:  No motor or sensory deficit.    Consultant(s) Notes Reviewed:  [x ] YES  [ ] NO  Care Discussed with Consultants/Other Providers [ x] YES  [ ] NO    EKG reviewed  Telemetry reviewed    LABS:                        13.4   9.04  )-----------( 196      ( 04 Mar 2018 06:03 )             40.0     03-04    141  |  104  |  12  ----------------------------<  89  3.9   |  33<H>  |  0.9    Ca    8.8      04 Mar 2018 06:03        Trop 0.04, CKMB --, CK 56, 03-03-18 @ 07:21  Trop 0.04, CKMB 5, CK 53, 03-03-18 @ 02:05  Trop 0.03, CKMB --, CK --, 03-02-18 @ 14:45        RADIOLOGY & ADDITIONAL TESTS:      Imaging or report Personally Reviewed:  [ ] YES  [ ] NO    Medications:  Standing  apixaban 5 milliGRAM(s) Oral every 12 hours  diltiazem    Tablet 60 milliGRAM(s) Oral every 6 hours  furosemide    Tablet 40 milliGRAM(s) Oral daily    PRN Meds      Case discussed with resident    Care discussed with pt/family

## 2018-03-06 LAB
ANION GAP SERPL CALC-SCNC: 7 MMOL/L — SIGNIFICANT CHANGE UP (ref 7–14)
BUN SERPL-MCNC: 12 MG/DL — SIGNIFICANT CHANGE UP (ref 10–20)
CALCIUM SERPL-MCNC: 9.2 MG/DL — SIGNIFICANT CHANGE UP (ref 8.5–10.1)
CHLORIDE SERPL-SCNC: 104 MMOL/L — SIGNIFICANT CHANGE UP (ref 98–110)
CO2 SERPL-SCNC: 27 MMOL/L — SIGNIFICANT CHANGE UP (ref 17–32)
CREAT SERPL-MCNC: 0.8 MG/DL — SIGNIFICANT CHANGE UP (ref 0.7–1.5)
GLUCOSE SERPL-MCNC: 126 MG/DL — HIGH (ref 70–110)
HCT VFR BLD CALC: 44.8 % — SIGNIFICANT CHANGE UP (ref 37–47)
HGB BLD-MCNC: 15.3 G/DL — SIGNIFICANT CHANGE UP (ref 12–16)
MCHC RBC-ENTMCNC: 30.5 PG — SIGNIFICANT CHANGE UP (ref 27–31)
MCHC RBC-ENTMCNC: 34.2 G/DL — SIGNIFICANT CHANGE UP (ref 32–37)
MCV RBC AUTO: 89.2 FL — SIGNIFICANT CHANGE UP (ref 81–99)
NRBC # BLD: 0 /100 WBCS — SIGNIFICANT CHANGE UP (ref 0–0)
PLATELET # BLD AUTO: 238 K/UL — SIGNIFICANT CHANGE UP (ref 130–400)
POTASSIUM SERPL-MCNC: 3.3 MMOL/L — LOW (ref 3.5–5)
POTASSIUM SERPL-SCNC: 3.3 MMOL/L — LOW (ref 3.5–5)
RBC # BLD: 5.02 M/UL — SIGNIFICANT CHANGE UP (ref 4.2–5.4)
RBC # FLD: 13.3 % — SIGNIFICANT CHANGE UP (ref 11.5–14.5)
SODIUM SERPL-SCNC: 138 MMOL/L — SIGNIFICANT CHANGE UP (ref 135–146)
WBC # BLD: 12.05 K/UL — HIGH (ref 4.8–10.8)
WBC # FLD AUTO: 12.05 K/UL — HIGH (ref 4.8–10.8)

## 2018-03-06 RX ORDER — POTASSIUM CHLORIDE 20 MEQ
40 PACKET (EA) ORAL ONCE
Qty: 0 | Refills: 0 | Status: COMPLETED | OUTPATIENT
Start: 2018-03-06 | End: 2018-03-06

## 2018-03-06 RX ORDER — APIXABAN 2.5 MG/1
1 TABLET, FILM COATED ORAL
Qty: 60 | Refills: 3
Start: 2018-03-06 | End: 2018-07-03

## 2018-03-06 RX ORDER — METOPROLOL TARTRATE 50 MG
50 TABLET ORAL DAILY
Qty: 0 | Refills: 0 | Status: DISCONTINUED | OUTPATIENT
Start: 2018-03-07 | End: 2018-03-07

## 2018-03-06 RX ADMIN — APIXABAN 5 MILLIGRAM(S): 2.5 TABLET, FILM COATED ORAL at 05:19

## 2018-03-06 RX ADMIN — AMIODARONE HYDROCHLORIDE 400 MILLIGRAM(S): 400 TABLET ORAL at 05:19

## 2018-03-06 RX ADMIN — Medication 25 MILLIGRAM(S): at 05:19

## 2018-03-06 RX ADMIN — Medication 5 MILLIGRAM(S): at 06:32

## 2018-03-06 RX ADMIN — Medication 5 MILLIGRAM(S): at 18:24

## 2018-03-06 RX ADMIN — Medication 40 MILLIEQUIVALENT(S): at 18:25

## 2018-03-06 RX ADMIN — AMIODARONE HYDROCHLORIDE 400 MILLIGRAM(S): 400 TABLET ORAL at 18:24

## 2018-03-06 RX ADMIN — Medication 40 MILLIGRAM(S): at 05:19

## 2018-03-06 RX ADMIN — APIXABAN 5 MILLIGRAM(S): 2.5 TABLET, FILM COATED ORAL at 18:24

## 2018-03-06 NOTE — PROGRESS NOTE ADULT - SUBJECTIVE AND OBJECTIVE BOX
KARAN LEMONS  67y Female    INTERVAL HPI/OVERNIGHT EVENTS:  Patient seen and examined. S/P unsuccessful CV. Remains in NSR. No SOB. No palpitations. Still on Amiodarone drip.      ROS: All other systems are negative.    Vital Signs:    T(F): 98 (03-06-18 @ 09:09), Max: 98.5 (03-05-18 @ 19:45)  HR: 106 (03-06-18 @ 11:05) (62 - 116)  BP: 108/77 (03-06-18 @ 11:05) (108/77 - 147/94)  RR: 16 (03-06-18 @ 09:09) (16 - 20)  SpO2: 97% (03-06-18 @ 05:19) (97% - 98%)  I&O's Summary    05 Mar 2018 07:01  -  06 Mar 2018 07:00  --------------------------------------------------------  IN: 366.5 mL / OUT: 2 mL / NET: 364.5 mL    06 Mar 2018 07:01  -  06 Mar 2018 12:28  --------------------------------------------------------  IN: 50 mL / OUT: 0 mL / NET: 50 mL    PHYSICAL EXAM:    GENERAL: Alert & oriented X 3. NAD  SKIN: No rashes or lesions  HENT: Atrumatic. Normocephalic. PERRL. Moist membranes.  NECK: Supple, No JVD. No lymphadenopathy.  PULMONARY: CTA B/L. No wheezing. No rales  CVS: Normal S1, S2. Rate and Rythm are regular. No murmurs, rubs or gallops.  ABDOMEN: Soft, Nontender, Nondistended; Bowel sounds present  EXTREMITIES: Peripheral pulses intact. No edema B/L.  NEUROLOGIC:  No motor or sensory deficit.    Consultant(s) Notes Reviewed:  [x ] YES  [ ] NO  Care Discussed with Consultants/Other Providers [ x] YES  [ ] NO    EKG reviewed  Telemetry reviewed    LABS:                        15.3   12.05 )-----------( 238      ( 06 Mar 2018 07:52 )             44.8     03-06    138  |  104  |  12  ----------------------------<  126<H>  3.3<L>   |  27  |  0.8    Ca    9.2      06 Mar 2018 07:52              RADIOLOGY & ADDITIONAL TESTS:      Imaging or report Personally Reviewed:  [ ] YES  [ ] NO    Medications:  Standing  amiodarone    Tablet 400 milliGRAM(s) Oral two times a day  amiodarone Infusion 0.5 mG/Min IV Continuous <Continuous>  apixaban 5 milliGRAM(s) Oral every 12 hours  enalapril 5 milliGRAM(s) Oral two times a day  furosemide    Tablet 40 milliGRAM(s) Oral daily  metoprolol     tartrate 25 milliGRAM(s) Oral two times a day    PRN Meds      Case discussed with resident    Care discussed with pt/family

## 2018-03-06 NOTE — PROGRESS NOTE ADULT - SUBJECTIVE AND OBJECTIVE BOX
INTERVAL HISTORY: No overnight events.  s/p unsuccessful cv  on iv amio  	  MEDICATIONS:  amiodarone    Tablet 400 milliGRAM(s) Oral two times a day  apixaban 5 milliGRAM(s) Oral every 12 hours  enalapril 5 milliGRAM(s) Oral two times a day  furosemide    Tablet 40 milliGRAM(s) Oral daily          PHYSICAL EXAM:  T(C): 36.7 (03-06-18 @ 13:05), Max: 36.9 (03-06-18 @ 01:34)  HR: 76 (03-06-18 @ 13:05) (67 - 116)  BP: 112/69 (03-06-18 @ 13:05) (108/77 - 147/94)  RR: 18 (03-06-18 @ 13:05) (16 - 20)  SpO2: 97% (03-06-18 @ 05:19) (97% - 97%)  Wt(kg): --  I&O's Summary    05 Mar 2018 07:01  -  06 Mar 2018 07:00  --------------------------------------------------------  IN: 366.5 mL / OUT: 2 mL / NET: 364.5 mL    06 Mar 2018 07:01  -  06 Mar 2018 19:47  --------------------------------------------------------  IN: 100 mL / OUT: 0 mL / NET: 100 mL          GENERAL: NAD, well-groomed, well-developed  NECK: Supple, No JVD, Normal thyroid  NERVOUS SYSTEM:  Alert & Oriented X3, Good concentration  CHEST/LUNG: Clear to percussion bilaterally;  HEART: irregular, 2/6 syst m+, no rubs, or gallops  ABDOMEN: Soft, Nontender, Nondistended; Bowel sounds present  EXTREMITIES:  No clubbing, cyanosis, trace edema  SKIN: No rashes or lesions    LABS:	 	                              15.3   12.05 )-----------( 238      ( 06 Mar 2018 07:52 )             44.8     03-06    138  |  104  |  12  ----------------------------<  126<H>  3.3<L>   |  27  |  0.8    Ca    9.2      06 Mar 2018 07:52    A/P    a.fib  chf/cm/mr    cont meds  uptitrate bb as bp tolerates  will need 4-6 weeks of amio prior to another cv attempt  life west discussed - pt declined  anticipate d/c home in am  replace k/mg

## 2018-03-06 NOTE — PROGRESS NOTE ADULT - ASSESSMENT
67y f with pmh of perforated diverticulitis s/p hartmans s/p reversal, galileo galileo disease p/w leg swelling and abd pain was found to have newly diagnosed afib and new onset chf    1) New onset a. fibrillation--CHADSVASC 3  -continue telemetry   OUMOU findings:  LVEF 30-35%  Mild to Moderate MR  Trace TR  No WILBER thrombus  *Cardioversion attempted X3 (200,300, 360). 3rd attempt was after a dose of amiodarone(150 mg) Despite multiple attempts the patient continued to be in atrial fibrilation  EKG : Atrial Fibrillation at rate of  107  bpm.   Recommendations:  *Continue all current medications including anticoagulant therapy. 5mg eliquis BID  *completed 24hr IV amiodarone. Transitioned to PO dosing 200mg BID for 5 days and then once daily going forward.     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 Pt does not require daily prednisone.     4) leukocytosis- patient just completed prednisone for skin disease    5) HCM  dvt ppx on eliquis   Low Na diet   dispo- home tomorrow 67y f with pmh of perforated diverticulitis s/p hartmans s/p reversal, galileo galileo disease p/w leg swelling and abd pain was found to have newly diagnosed afib and new onset chf    1) New onset a. fibrillation--CHADSVASC 3  -continue telemetry   OUMOU findings:  LVEF 30-35%  Mild to Moderate MR  Trace TR  No WILBER thrombus  *Cardioversion attempted X3 (200,300, 360). 3rd attempt was after a dose of amiodarone(150 mg) Despite multiple attempts the patient continued to be in atrial fibrilation  EKG : Atrial Fibrillation at rate of  107  bpm.   Recommendations:  *Continue all current medications including anticoagulant therapy. 5mg eliquis BID  *completed 24hr IV amiodarone. Transitioned to PO dosing 400mg BID for 5 days and then once daily going forward.   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 Pt does not require daily prednisone.     4) leukocytosis- patient just completed prednisone for skin disease    5) HCM  dvt ppx on eliquis   Low Na diet   dispo- home tomorrow

## 2018-03-06 NOTE — PROGRESS NOTE ADULT - ASSESSMENT
67y f with pmh of perforated diverticulitis s/p hartmans s/p reversal, galileo galileo disease p/w leg swelling and abd pain was found to have newly diagnosed afib and new onset chf    1.	New onset AF- rate controlled now  2.	Ac. HFrEF (EF 30-35%)  3.	Mild to Mod MR  4.	Galileo Galileo disease  5.	Hypokalemia       PLAN:    ·	Cont tele  ·	Care D/W the card Dr. Rodriguez. He D/W the pt regardigng life vest. PT did not agree for life vest  ·	Cont Eliquis, enalapril and metoprolol  ·	Amiodaron 400mg po q12h for four more days on D/C, then 200mg po q24h.  ·	Cont lasix 40mg po q24h.  ·	Prednisone PRN  ·	Replete K  ·	ECHO  ·	Anticipate D/C in AM

## 2018-03-06 NOTE — PROGRESS NOTE ADULT - SUBJECTIVE AND OBJECTIVE BOX
SUBJECTIVE:    Patient is a 67y old Female who presents with a chief complaint of Afib with RVR and new onset CHF (02 Mar 2018 20:03)    Currently admitted to medicine with the primary diagnosis of Atrial fibrillation with RVR     Today is hospital day 4d. This morning she is resting comfortably in bed and reports no new issues or overnight events.     PAST MEDICAL & SURGICAL HISTORY  Perforated diverticulum  Retraction of colostomy  Status post Claritza procedure    SOCIAL HISTORY:  Negative for smoking/alcohol/drug use.     ALLERGIES:  No Known Allergies    MEDICATIONS:  STANDING MEDICATIONS  amiodarone    Tablet 400 milliGRAM(s) Oral two times a day  amiodarone Infusion 0.5 mG/Min IV Continuous <Continuous>  apixaban 5 milliGRAM(s) Oral every 12 hours  enalapril 5 milliGRAM(s) Oral two times a day  furosemide    Tablet 40 milliGRAM(s) Oral daily  metoprolol     tartrate 25 milliGRAM(s) Oral two times a day  potassium chloride    Tablet ER 40 milliEquivalent(s) Oral once    PRN MEDICATIONS    VITALS:   T(F): 98  HR: 106  BP: 108/77  RR: 16  SpO2: 97%    LABS:                        15.3   12.05 )-----------( 238      ( 06 Mar 2018 07:52 )             44.8     03-06    138  |  104  |  12  ----------------------------<  126<H>  3.3<L>   |  27  |  0.8    Ca    9.2      06 Mar 2018 07:52                    RADIOLOGY:    PHYSICAL EXAM:  GEN: No acute distress  LUNGS: Clear to auscultation bilaterally   HEART: S1/S2 present. rapid, irregular   ABD: Soft, non-tender, non-distended. Bowel sounds present  EXT: NC/NC/NE/2+PP/MOSS  NEURO: AAOX3

## 2018-03-07 ENCOUNTER — TRANSCRIPTION ENCOUNTER (OUTPATIENT)
Age: 68
End: 2018-03-07

## 2018-03-07 LAB
ANION GAP SERPL CALC-SCNC: 2 MMOL/L — LOW (ref 7–14)
BUN SERPL-MCNC: 18 MG/DL — SIGNIFICANT CHANGE UP (ref 10–20)
CALCIUM SERPL-MCNC: 9.3 MG/DL — SIGNIFICANT CHANGE UP (ref 8.5–10.1)
CHLORIDE SERPL-SCNC: 105 MMOL/L — SIGNIFICANT CHANGE UP (ref 98–110)
CO2 SERPL-SCNC: 32 MMOL/L — SIGNIFICANT CHANGE UP (ref 17–32)
CREAT SERPL-MCNC: 1 MG/DL — SIGNIFICANT CHANGE UP (ref 0.7–1.5)
GLUCOSE SERPL-MCNC: 85 MG/DL — SIGNIFICANT CHANGE UP (ref 70–110)
HCT VFR BLD CALC: 45.5 % — SIGNIFICANT CHANGE UP (ref 37–47)
HGB BLD-MCNC: 15 G/DL — SIGNIFICANT CHANGE UP (ref 12–16)
MAGNESIUM SERPL-MCNC: 2 MG/DL — SIGNIFICANT CHANGE UP (ref 1.8–2.4)
MCHC RBC-ENTMCNC: 30.4 PG — SIGNIFICANT CHANGE UP (ref 27–31)
MCHC RBC-ENTMCNC: 33 G/DL — SIGNIFICANT CHANGE UP (ref 32–37)
MCV RBC AUTO: 92.1 FL — SIGNIFICANT CHANGE UP (ref 81–99)
NRBC # BLD: 0 /100 WBCS — SIGNIFICANT CHANGE UP (ref 0–0)
PLATELET # BLD AUTO: 240 K/UL — SIGNIFICANT CHANGE UP (ref 130–400)
POTASSIUM SERPL-MCNC: 4.7 MMOL/L — SIGNIFICANT CHANGE UP (ref 3.5–5)
POTASSIUM SERPL-SCNC: 4.7 MMOL/L — SIGNIFICANT CHANGE UP (ref 3.5–5)
RBC # BLD: 4.94 M/UL — SIGNIFICANT CHANGE UP (ref 4.2–5.4)
RBC # FLD: 13.5 % — SIGNIFICANT CHANGE UP (ref 11.5–14.5)
SODIUM SERPL-SCNC: 139 MMOL/L — SIGNIFICANT CHANGE UP (ref 135–146)
WBC # BLD: 10.1 K/UL — SIGNIFICANT CHANGE UP (ref 4.8–10.8)
WBC # FLD AUTO: 10.1 K/UL — SIGNIFICANT CHANGE UP (ref 4.8–10.8)

## 2018-03-07 RX ORDER — METOPROLOL TARTRATE 50 MG
1 TABLET ORAL
Qty: 30 | Refills: 0 | OUTPATIENT
Start: 2018-03-07 | End: 2018-04-05

## 2018-03-07 RX ORDER — FUROSEMIDE 40 MG
1 TABLET ORAL
Qty: 14 | Refills: 0 | OUTPATIENT
Start: 2018-03-07 | End: 2018-03-20

## 2018-03-07 RX ORDER — FUROSEMIDE 40 MG
1 TABLET ORAL
Qty: 30 | Refills: 0
Start: 2018-03-07 | End: 2018-04-05

## 2018-03-07 RX ORDER — METOPROLOL TARTRATE 50 MG
1 TABLET ORAL
Qty: 0 | Refills: 0 | DISCHARGE
Start: 2018-03-07 | End: 2018-04-05

## 2018-03-07 RX ORDER — METOPROLOL TARTRATE 50 MG
1 TABLET ORAL
Qty: 30 | Refills: 0
Start: 2018-03-07 | End: 2018-04-05

## 2018-03-07 RX ORDER — AMIODARONE HYDROCHLORIDE 400 MG/1
2 TABLET ORAL
Qty: 120 | Refills: 0
Start: 2018-03-07 | End: 2018-04-05

## 2018-03-07 RX ADMIN — AMIODARONE HYDROCHLORIDE 400 MILLIGRAM(S): 400 TABLET ORAL at 06:01

## 2018-03-07 RX ADMIN — APIXABAN 5 MILLIGRAM(S): 2.5 TABLET, FILM COATED ORAL at 06:00

## 2018-03-07 RX ADMIN — Medication 5 MILLIGRAM(S): at 06:00

## 2018-03-07 RX ADMIN — Medication 50 MILLIGRAM(S): at 06:01

## 2018-03-07 RX ADMIN — Medication 40 MILLIGRAM(S): at 06:00

## 2018-03-07 NOTE — DISCHARGE NOTE ADULT - PLAN OF CARE
medical management and follow up Pt presented with new onset atrial fibrillation with rapid ventricular rate. ECHO was done which showed   LVEF 30-35%. Mild to Moderate MR, Trace TR, No WILEBR thrombus. Cardioversion attempted X3. 3rd attempt was after a dose of amiodarone(150 mg) Despite multiple attempts the patient continued to be in atrial fibrillation. Patient will go home on anti-coagulation, approved by pharmacy, 5mg eliquis BID.  Pt completed 24hr IV amiodarone. Transitioned to PO dosing 400mg BID for 5 days and then once daily going forward. She will follow up with Dr. Rodriguez in 2 weeks.   d/c on metoprolol succinate 100mg once daily, enalapril 5mg once daily, and lasix 40mg once daily continue medical management Shortness of breath resolved.  Please continue taking lasix 40mg daily until re-evaluated by Dr. Rodriguez. Pt presented with new onset atrial fibrillation with rapid ventricular rate. ECHO was done which showed   LVEF 30-35%. Mild to Moderate MR, Trace TR, No WILBER thrombus. Cardioversion attempted X3. 3rd attempt was after a dose of amiodarone(150 mg) Despite multiple attempts the patient continued to be in atrial fibrillation. Patient will go home on anti-coagulation, approved by pharmacy, 5mg eliquis BID.  Pt completed 24hr IV amiodarone. Transitioned to PO dosing 400mg BID for 5 days and then once daily going forward. She will follow up with Dr. Rodriguez in 2 weeks.   d/c on metoprolol succinate 50mg once daily, enalapril 5mg once daily, and lasix 40mg once daily Shortness of breath resolved.  Please continue taking lasix 40mg once DAILY

## 2018-03-07 NOTE — PROGRESS NOTE ADULT - ASSESSMENT
1. Atrial fibrillation s/p unsuccessful cardioversion but now in NSR on Amiodarone  continue Amiodarone, Bblocker and Eliquis  outpt f/u with cardio  may d/c home today after labs are checked    2. Hypokalemia - repleted yesterday and check labs today    3. Acute HFrEF - on lasix, ACE-I, Bblocker  volume status improved  pt did not want life vest  repeat ECHO as outpt    4. diverticulitis s/p Mendoza's and reversal    5. Yady Yady disease

## 2018-03-07 NOTE — DISCHARGE NOTE ADULT - PATIENT PORTAL LINK FT
You can access the [facility] Portal, offered by [facility], by registering with the following website: [email or web address]

## 2018-03-07 NOTE — DISCHARGE NOTE ADULT - CARE PROVIDERS DIRECT ADDRESSES
[email or web address]

## 2018-03-07 NOTE — DISCHARGE NOTE ADULT - MEDICATION SUMMARY - MEDICATIONS TO TAKE
I will START or STAY ON the medications listed below when I get home from the hospital:    predniSONE 5 mg oral tablet  -- 1 tab(s) by mouth once a day  -- Indication: For Yady Conteh    enalapril 5 mg oral tablet  -- 1 tab(s) by mouth once a day (in the morning)   -- Indication: For HTN    amiodarone 200 mg oral tablet  -- 2 tab(s) by mouth 2 times a day for 5 days and then one tab once a day.   -- Indication: For Atrial fibrillation with RVR    Eliquis 5 mg oral tablet  -- 1 tab(s) by mouth 2 times a day   -- Check with your doctor before becoming pregnant.  It is very important that you take or use this exactly as directed.  Do not skip doses or discontinue unless directed by your doctor.  Obtain medical advice before taking any non-prescription drugs as some may affect the action of this medication.    -- Indication: For Atrial fibrillation with RVR    Metoprolol Succinate ER 50 mg oral tablet, extended release  -- 1 tab(s) by mouth once a day   -- It is very important that you take or use this exactly as directed.  Do not skip doses or discontinue unless directed by your doctor.  May cause drowsiness.  Alcohol may intensify this effect.  Use care when operating dangerous machinery.  Some non-prescription drugs may aggravate your condition.  Read all labels carefully.  If a warning appears, check with your doctor before taking.  Swallow whole.  Do not crush.  Take with food or milk.  This drug may impair the ability to drive or operate machinery.  Use care until you become familiar with its effects.    -- Indication: For Atrial fibrillation with RVR    furosemide 40 mg oral tablet  -- 1 tab(s) by mouth once a day  -- Indication: For CHF (congestive heart failure)

## 2018-03-07 NOTE — PROGRESS NOTE ADULT - PROVIDER SPECIALTY LIST ADULT
Cardiology
Hospitalist
Hospitalist
Internal Medicine

## 2018-03-07 NOTE — DISCHARGE NOTE ADULT - CARE PLAN
Principal Discharge DX:	Atrial fibrillation with RVR  Goal:	medical management and follow up  Assessment and plan of treatment:	Pt presented with new onset atrial fibrillation with rapid ventricular rate. ECHO was done which showed   LVEF 30-35%. Mild to Moderate MR, Trace TR, No WILBER thrombus. Cardioversion attempted X3. 3rd attempt was after a dose of amiodarone(150 mg) Despite multiple attempts the patient continued to be in atrial fibrillation. Patient will go home on anti-coagulation, approved by pharmacy, 5mg eliquis BID.  Pt completed 24hr IV amiodarone. Transitioned to PO dosing 400mg BID for 5 days and then once daily going forward. She will follow up with Dr. Rodriguez in 2 weeks.   d/c on metoprolol succinate 100mg once daily, enalapril 5mg once daily, and lasix 40mg once daily  Secondary Diagnosis:	CHF (congestive heart failure)  Goal:	continue medical management  Assessment and plan of treatment:	Shortness of breath resolved.  Please continue taking lasix 40mg daily until re-evaluated by Dr. Rodriguez. Principal Discharge DX:	Atrial fibrillation with RVR  Goal:	medical management and follow up  Assessment and plan of treatment:	Pt presented with new onset atrial fibrillation with rapid ventricular rate. ECHO was done which showed   LVEF 30-35%. Mild to Moderate MR, Trace TR, No WILBER thrombus. Cardioversion attempted X3. 3rd attempt was after a dose of amiodarone(150 mg) Despite multiple attempts the patient continued to be in atrial fibrillation. Patient will go home on anti-coagulation, approved by pharmacy, 5mg eliquis BID.  Pt completed 24hr IV amiodarone. Transitioned to PO dosing 400mg BID for 5 days and then once daily going forward. She will follow up with Dr. Rodriguez in 2 weeks.   d/c on metoprolol succinate 50mg once daily, enalapril 5mg once daily, and lasix 40mg once daily  Secondary Diagnosis:	CHF (congestive heart failure)  Goal:	continue medical management  Assessment and plan of treatment:	Shortness of breath resolved.  Please continue taking lasix 40mg once DAILY

## 2018-03-07 NOTE — DISCHARGE NOTE ADULT - HOSPITAL COURSE
67 y f with pmh of perforated diverticulitis s/p quick procedure and reversal in 2010 came in with abd pain and leg swelling. 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 On 2nd day, she started noticing leg swelling and stopped taking keflex. she also c/o abd pain which is pressure like, non radiating, intermittent along with palpitations, orthopnea but denies any cough, sob, n/v, change in bowel or urinary habits, fever, chills, weakness. In ED, she was found to be newly diagnosed afib and was given cardizem 15mg iv and 30mg po along with lasix 20mg iv. As per pt, today is her last dose of prednisone 5mg.  Pt presented with new onset atrial fibrillation with rapid ventricular rate. ECHO was done which showed LVEF 30-35%. Mild to Moderate MR, Trace TR, No WILBER thrombus. Cardioversion attempted X3. 3rd attempt was after a dose of amiodarone(150 mg). Despite multiple attempts the patient continued to be in atrial fibrillation. Patient will go home on anti-coagulation, approved by pharmacy, 5mg eliquis BID. Pt completed 24hr IV amiodarone. Transitioned to PO dosing 400mg BID for 5 days and then once daily going forward. She will follow up with Dr. Rodriguez in 2 weeks.  d/c on metoprolol succinate 100mg once daily, enalapril 5mg once daily, and lasix 40mg once daily. 67 y f with pmh of perforated diverticulitis s/p quick procedure and reversal in 2010 came in with abd pain and leg swelling. 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 On 2nd day, she started noticing leg swelling and stopped taking keflex. she also c/o abd pain which is pressure like, non radiating, intermittent along with palpitations, orthopnea but denies any cough, sob, n/v, change in bowel or urinary habits, fever, chills, weakness. In ED, she was found to be newly diagnosed afib and was given cardizem 15mg iv and 30mg po along with lasix 20mg iv. As per pt, today is her last dose of prednisone 5mg.  Pt presented with new onset atrial fibrillation with rapid ventricular rate. ECHO was done which showed LVEF 30-35%. Mild to Moderate MR, Trace TR, No WILBER thrombus. Cardioversion attempted X3. 3rd attempt was after a dose of amiodarone(150 mg). Despite multiple attempts the patient continued to be in atrial fibrillation.   Patient now in sinus rhythm. Patient will go home on anti-coagulation, approved by pharmacy, 5mg eliquis BID. Pt completed 24hr IV amiodarone. Transitioned to PO dosing 400mg BID for 5 days and then once daily going forward. She will follow up with Dr. Rodriguez in 2 weeks.  d/c on metoprolol succinate 50mg once daily, enalapril 5mg once daily, and lasix 40mg once daily.

## 2018-03-07 NOTE — PROGRESS NOTE ADULT - SUBJECTIVE AND OBJECTIVE BOX
INTERVAL HISTORY: No overnight events. converted to nsr today post amio load  no active [facility]vs complains  	  MEDICATIONS:  amiodarone    Tablet 400 milliGRAM(s) Oral two times a day  apixaban 5 milliGRAM(s) Oral every 12 hours  enalapril 5 milliGRAM(s) Oral two times a day  furosemide    Tablet 40 milliGRAM(s) Oral daily  metoprolol succinate ER 50 milliGRAM(s) Oral daily      PHYSICAL EXAM:  T(C): 36.3 (03-07-18 @ 13:45), Max: 36.8 (03-06-18 @ 20:56)  HR: 69 (03-07-18 @ 13:45) (69 - 118)  BP: 101/59 (03-07-18 @ 13:45) (101/59 - 132/81)  RR: 17 (03-07-18 @ 13:45) (17 - 18)  SpO2: 96% (03-07-18 @ 07:44) (96% - 96%)  Wt(kg): --  I&O's Summary    06 Mar 2018 07:01  -  07 Mar 2018 07:00  --------------------------------------------------------  IN: 100 mL / OUT: 0 mL / NET: 100 mL          GENERAL: NAD, well-groomed, well-developed  NECK: Supple, No JVD, Normal thyroid  NERVOUS SYSTEM:  Alert & Oriented X3, Good concentration  CHEST/LUNG: Clear to percussion bilaterally; No rales, rhonchi, wheezing, or rubs  HEART: Regular rate and rhythm; 2/6 syst m+, no rubs, or gallops  ABDOMEN: Soft, Nontender, Nondistended; Bowel sounds present  EXTREMITIES:   No clubbing, cyanosis, or edema    LABS:	 	                              15.0   10.10 )-----------( 240      ( 07 Mar 2018 12:13 )             45.5     03-07    139  |  105  |  18  ----------------------------<  85  4.7   |  32  |  1.0    Ca    9.3      07 Mar 2018 12:13  Mg     2.0     03-07      A/P    A.fib - nsr on amio  CM/CHF    cont meds  d/c home today  recheck 2d echo 6-8 weeks post d/c - if ef< 40% - cath  keep k>4, mg>2  outpt card f/up 2-3 weeks

## 2018-03-07 NOTE — PROGRESS NOTE ADULT - SUBJECTIVE AND OBJECTIVE BOX
Discharge note    KARAN LEMONS  67y Female    INTERVAL HPI/OVERNIGHT EVENTS:  No complaints. She now converted to NSR with rate of 60.    T(F): 96.6 (03-07-18 @ 05:55), Max: 98.2 (03-06-18 @ 20:56)  HR: 118 (03-07-18 @ 05:55) (76 - 118)  BP: 111/88 (03-07-18 @ 05:55) (111/88 - 132/81)  RR: 18 (03-07-18 @ 05:55) (18 - 18)  SpO2: 96% (03-07-18 @ 07:44) (96% - 96%) on RA    I&O's Summary    06 Mar 2018 07:01  -  07 Mar 2018 07:00  --------------------------------------------------------  IN: 100 mL / OUT: 0 mL / NET: 100 mL      PHYSICAL EXAM:  GENERAL: NAD  HEAD:  Normocephalic  EYES:  conjunctiva and sclera clear  ENMT: Moist mucous membranes  NECK: Supple, No JVD  NERVOUS SYSTEM:  Alert & Oriented X3, Good concentration  CHEST/LUNG: Clear to percussion bilaterally; No rales, rhonchi, wheezing  HEART: Regular rate and rhythm; No murmurs  ABDOMEN: Soft, Nontender, Nondistended; Bowel sounds present  EXTREMITIES:  No edema    Consultant(s) Notes Reviewed:  [x ] YES  [ ] NO  Care Discussed with Other Providers on rounds [ x] YES  [ ] NO    Medications reviewed    Telemetry reviewed    LABS:                        15.3   12.05 )-----------( 238      ( 06 Mar 2018 07:52 )             44.8     03-06    138  |  104  |  12  ----------------------------<  126<H>  3.3<L>   |  27  |  0.8    Ca    9.2      06 Mar 2018 07:52                RADIOLOGY & ADDITIONAL TESTS:    report Personally Reviewed:  [x ] YES  [ ] NO    Case discussed with resident    Care discussed with pt

## 2018-03-07 NOTE — DISCHARGE NOTE ADULT - CARE PROVIDER_API CALL
Candido Rodriguez), Cardiovascular Disease; Internal Medicine; Interventional Cardiology; Nuclear Cardiology  [address] [place] [address]  Phone: [phone]  Fax: [phone]

## 2018-03-08 VITALS
TEMPERATURE: 97 F | HEART RATE: 91 BPM | SYSTOLIC BLOOD PRESSURE: 162 MMHG | DIASTOLIC BLOOD PRESSURE: 70 MMHG | RESPIRATION RATE: 17 BRPM

## 2018-03-08 DIAGNOSIS — I50.31 ACUTE DIASTOLIC (CONGESTIVE) HEART FAILURE: ICD-10-CM

## 2018-03-08 DIAGNOSIS — I42.9 CARDIOMYOPATHY, UNSPECIFIED: ICD-10-CM

## 2018-03-08 DIAGNOSIS — D72.829 ELEVATED WHITE BLOOD CELL COUNT, UNSPECIFIED: ICD-10-CM

## 2018-03-08 DIAGNOSIS — I25.9 CHRONIC ISCHEMIC HEART DISEASE, UNSPECIFIED: ICD-10-CM

## 2018-03-08 DIAGNOSIS — I48.91 UNSPECIFIED ATRIAL FIBRILLATION: ICD-10-CM

## 2018-03-08 DIAGNOSIS — Q82.8 OTHER SPECIFIED CONGENITAL MALFORMATIONS OF SKIN: ICD-10-CM

## 2018-03-08 DIAGNOSIS — E87.6 HYPOKALEMIA: ICD-10-CM

## 2018-03-08 DIAGNOSIS — I34.0 NONRHEUMATIC MITRAL (VALVE) INSUFFICIENCY: ICD-10-CM

## 2018-03-08 DIAGNOSIS — I11.0 HYPERTENSIVE HEART DISEASE WITH HEART FAILURE: ICD-10-CM

## 2018-03-15 DIAGNOSIS — I50.21 ACUTE SYSTOLIC (CONGESTIVE) HEART FAILURE: ICD-10-CM

## 2018-03-28 ENCOUNTER — APPOINTMENT (OUTPATIENT)
Dept: CARDIOLOGY | Facility: CLINIC | Age: 68
End: 2018-03-28

## 2018-03-28 VITALS
HEART RATE: 55 BPM | WEIGHT: 122 LBS | BODY MASS INDEX: 19.61 KG/M2 | HEIGHT: 66 IN | SYSTOLIC BLOOD PRESSURE: 136 MMHG | DIASTOLIC BLOOD PRESSURE: 76 MMHG

## 2018-03-28 DIAGNOSIS — Z78.9 OTHER SPECIFIED HEALTH STATUS: ICD-10-CM

## 2018-03-28 DIAGNOSIS — Q82.8 OTHER SPECIFIED CONGENITAL MALFORMATIONS OF SKIN: ICD-10-CM

## 2018-04-11 ENCOUNTER — OUTPATIENT (OUTPATIENT)
Dept: OUTPATIENT SERVICES | Facility: HOSPITAL | Age: 68
LOS: 1 days | Discharge: HOME | End: 2018-04-11

## 2018-04-11 ENCOUNTER — LABORATORY RESULT (OUTPATIENT)
Age: 68
End: 2018-04-11

## 2018-04-11 DIAGNOSIS — K94.09 OTHER COMPLICATIONS OF COLOSTOMY: Chronic | ICD-10-CM

## 2018-04-11 DIAGNOSIS — I50.20 UNSPECIFIED SYSTOLIC (CONGESTIVE) HEART FAILURE: ICD-10-CM

## 2018-04-11 DIAGNOSIS — Z93.3 COLOSTOMY STATUS: Chronic | ICD-10-CM

## 2018-04-12 LAB
ALBUMIN SERPL ELPH-MCNC: 3.9 G/DL
ALP BLD-CCNC: 78 U/L
ALT SERPL-CCNC: 17 U/L
ANION GAP SERPL CALC-SCNC: 11 MMOL/L
AST SERPL-CCNC: 22 U/L
BILIRUB SERPL-MCNC: 0.7 MG/DL
BUN SERPL-MCNC: 24 MG/DL
CALCIUM SERPL-MCNC: 9.4 MG/DL
CHLORIDE SERPL-SCNC: 103 MMOL/L
CHOLEST SERPL-MCNC: 227 MG/DL
CHOLEST/HDLC SERPL: 2.2 RATIO
CK SERPL-CCNC: 67 U/L
CO2 SERPL-SCNC: 30 MMOL/L
CREAT SERPL-MCNC: 0.9 MG/DL
GLUCOSE SERPL-MCNC: 88 MG/DL
HDLC SERPL-MCNC: 101 MG/DL
LDLC SERPL CALC-MCNC: 121 MG/DL
POTASSIUM SERPL-SCNC: 3.9 MMOL/L
PROT SERPL-MCNC: 6.2 G/DL
SODIUM SERPL-SCNC: 144 MMOL/L
TRIGL SERPL-MCNC: 110 MG/DL
TSH SERPL-ACNC: 3.33 UIU/ML

## 2018-04-18 ENCOUNTER — APPOINTMENT (OUTPATIENT)
Dept: CARDIOLOGY | Facility: CLINIC | Age: 68
End: 2018-04-18

## 2018-05-09 ENCOUNTER — APPOINTMENT (OUTPATIENT)
Dept: CARDIOLOGY | Facility: CLINIC | Age: 68
End: 2018-05-09

## 2018-05-09 VITALS — HEART RATE: 52 BPM

## 2018-05-09 VITALS
HEIGHT: 66 IN | WEIGHT: 130 LBS | BODY MASS INDEX: 20.89 KG/M2 | SYSTOLIC BLOOD PRESSURE: 156 MMHG | DIASTOLIC BLOOD PRESSURE: 80 MMHG

## 2018-05-09 RX ORDER — AMIODARONE HYDROCHLORIDE 200 MG/1
200 TABLET ORAL DAILY
Qty: 30 | Refills: 2 | Status: DISCONTINUED | COMMUNITY
End: 2018-05-09

## 2018-05-09 RX ORDER — FUROSEMIDE 40 MG/1
40 TABLET ORAL
Qty: 90 | Refills: 3 | Status: DISCONTINUED | COMMUNITY
End: 2018-05-09

## 2018-07-03 ENCOUNTER — MEDICATION RENEWAL (OUTPATIENT)
Age: 68
End: 2018-07-03

## 2018-07-18 ENCOUNTER — APPOINTMENT (OUTPATIENT)
Dept: CARDIOLOGY | Facility: CLINIC | Age: 68
End: 2018-07-18

## 2018-07-18 VITALS
HEART RATE: 57 BPM | SYSTOLIC BLOOD PRESSURE: 132 MMHG | WEIGHT: 124 LBS | DIASTOLIC BLOOD PRESSURE: 76 MMHG | HEIGHT: 66 IN | BODY MASS INDEX: 19.93 KG/M2

## 2018-11-14 ENCOUNTER — OUTPATIENT (OUTPATIENT)
Dept: OUTPATIENT SERVICES | Facility: HOSPITAL | Age: 68
LOS: 1 days | Discharge: HOME | End: 2018-11-14

## 2018-11-14 DIAGNOSIS — I42.9 CARDIOMYOPATHY, UNSPECIFIED: ICD-10-CM

## 2018-11-14 DIAGNOSIS — Z93.3 COLOSTOMY STATUS: Chronic | ICD-10-CM

## 2018-11-14 DIAGNOSIS — K94.09 OTHER COMPLICATIONS OF COLOSTOMY: Chronic | ICD-10-CM

## 2018-11-16 LAB
ALBUMIN SERPL ELPH-MCNC: 4.4 G/DL
ALP BLD-CCNC: 72 U/L
ALT SERPL-CCNC: 22 U/L
ANION GAP SERPL CALC-SCNC: 17 MMOL/L
AST SERPL-CCNC: 29 U/L
BASOPHILS # BLD AUTO: 0.05 K/UL
BASOPHILS NFR BLD AUTO: 0.7 %
BILIRUB SERPL-MCNC: 0.8 MG/DL
BUN SERPL-MCNC: 17 MG/DL
CALCIUM SERPL-MCNC: 9.4 MG/DL
CHLORIDE SERPL-SCNC: 101 MMOL/L
CHOLEST SERPL-MCNC: 225 MG/DL
CHOLEST/HDLC SERPL: 2.4 RATIO
CK SERPL-CCNC: 102 U/L
CO2 SERPL-SCNC: 24 MMOL/L
CREAT SERPL-MCNC: 0.7 MG/DL
EOSINOPHIL # BLD AUTO: 0.28 K/UL
EOSINOPHIL NFR BLD AUTO: 4 %
GLUCOSE SERPL-MCNC: 82 MG/DL
HCT VFR BLD CALC: 38.4 %
HDLC SERPL-MCNC: 93 MG/DL
HGB BLD-MCNC: 12.9 G/DL
IMM GRANULOCYTES NFR BLD AUTO: 0.3 %
LDLC SERPL CALC-MCNC: 134 MG/DL
LYMPHOCYTES # BLD AUTO: 1.76 K/UL
LYMPHOCYTES NFR BLD AUTO: 25 %
MAN DIFF?: NORMAL
MCHC RBC-ENTMCNC: 30.9 PG
MCHC RBC-ENTMCNC: 33.6 G/DL
MCV RBC AUTO: 92.1 FL
MONOCYTES # BLD AUTO: 0.51 K/UL
MONOCYTES NFR BLD AUTO: 7.2 %
NEUTROPHILS # BLD AUTO: 4.42 K/UL
NEUTROPHILS NFR BLD AUTO: 62.8 %
PLATELET # BLD AUTO: 199 K/UL
POTASSIUM SERPL-SCNC: 3.9 MMOL/L
PROT SERPL-MCNC: 6.8 G/DL
RBC # BLD: 4.17 M/UL
RBC # FLD: 11.9 %
SODIUM SERPL-SCNC: 142 MMOL/L
TRIGL SERPL-MCNC: 66 MG/DL
TSH SERPL-ACNC: 0.9 UIU/ML
WBC # FLD AUTO: 7.04 K/UL

## 2018-12-03 NOTE — ED ADULT NURSE NOTE - ED STAT RN HAND OFF
Telephone Encounter by Michael Smalls MD at 09/05/18 12:19 PM     Author:  Michael Smalls MD Service:  (none) Author Type:  Physician     Filed:  09/05/18 12:19 PM Encounter Date:  9/5/2018 Status:  Signed     :  Michael Smalls MD (Physician)            Would Rx vitamin K 5 mg p.o. ×1[MP1.1M]      Revision History        User Key Date/Time User Provider Type Action    > MP1.1 09/05/18 12:19 PM Michael Smalls MD Physician Sign    M - Manual             Handoff

## 2018-12-05 ENCOUNTER — APPOINTMENT (OUTPATIENT)
Dept: CARDIOLOGY | Facility: CLINIC | Age: 68
End: 2018-12-05

## 2018-12-05 VITALS
HEIGHT: 65 IN | SYSTOLIC BLOOD PRESSURE: 168 MMHG | HEART RATE: 69 BPM | DIASTOLIC BLOOD PRESSURE: 83 MMHG | WEIGHT: 131 LBS | BODY MASS INDEX: 21.83 KG/M2

## 2018-12-05 VITALS — WEIGHT: 131 LBS | HEIGHT: 65 IN | BODY MASS INDEX: 21.83 KG/M2

## 2018-12-05 NOTE — ASSESSMENT
[FreeTextEntry1] : 69 yo female with pmhx and presentation as above\par Hx of HF\par A.fib/HTN\par cont meds\par bp slightly high, likely sec to steroids\par repeat labs reviewed, lipid lowering measures discussed\par aggressive risk modif\par low salt diet and act as tolerated\par RTC 4 months\par

## 2018-12-05 NOTE — PHYSICAL EXAM
[General Appearance - Well Developed] : well developed [Normal Appearance] : normal appearance [Well Groomed] : well groomed [General Appearance - Well Nourished] : well nourished [No Deformities] : no deformities [General Appearance - In No Acute Distress] : no acute distress [Normal Oral Mucosa] : normal oral mucosa [No Oral Pallor] : no oral pallor [No Oral Cyanosis] : no oral cyanosis [Normal Jugular Venous A Waves Present] : normal jugular venous A waves present [Normal Jugular Venous V Waves Present] : normal jugular venous V waves present [No Jugular Venous Pearl A Waves] : no jugular venous pearl A waves [Respiration, Rhythm And Depth] : normal respiratory rhythm and effort [Exaggerated Use Of Accessory Muscles For Inspiration] : no accessory muscle use [Auscultation Breath Sounds / Voice Sounds] : lungs were clear to auscultation bilaterally [Heart Rate And Rhythm] : heart rate and rhythm were normal [Heart Sounds] : normal S1 and S2 [Murmurs] : no murmurs present [Abdomen Soft] : soft [Abdomen Tenderness] : non-tender [Abdomen Mass (___ Cm)] : no abdominal mass palpated [Nail Clubbing] : no clubbing of the fingernails [Cyanosis, Localized] : no localized cyanosis [Petechial Hemorrhages (___cm)] : no petechial hemorrhages [Skin Color & Pigmentation] : normal skin color and pigmentation [] : no rash [No Venous Stasis] : no venous stasis [Skin Lesions] : no skin lesions [No Skin Ulcers] : no skin ulcer [No Xanthoma] : no  xanthoma was observed

## 2018-12-05 NOTE — HISTORY OF PRESENT ILLNESS
[FreeTextEntry1] : 67 yo female with pmhx as below is here for a f/up visit\par 2018 admission to [facility] with adhf and a. fib\par cardioverted and sent home on med rx for hf\par gained few lbs, short course of steroids by derm\par no c/o long, cp, orthopnea, pnd or le edema\par compliant with meds\par et is stable\par ros is otherwise as below

## 2018-12-05 NOTE — REVIEW OF SYSTEMS
[Feeling Fatigued] : feeling fatigued [see HPI] : see HPI [Joint Pain] : joint pain [Negative] : Endocrine

## 2019-04-10 ENCOUNTER — APPOINTMENT (OUTPATIENT)
Dept: CARDIOLOGY | Facility: CLINIC | Age: 69
End: 2019-04-10
Payer: MEDICARE

## 2019-04-10 VITALS
SYSTOLIC BLOOD PRESSURE: 152 MMHG | HEIGHT: 65 IN | HEART RATE: 125 BPM | WEIGHT: 133 LBS | DIASTOLIC BLOOD PRESSURE: 84 MMHG | BODY MASS INDEX: 22.16 KG/M2

## 2019-04-10 PROCEDURE — 93000 ELECTROCARDIOGRAM COMPLETE: CPT

## 2019-04-10 PROCEDURE — 99214 OFFICE O/P EST MOD 30 MIN: CPT

## 2019-04-10 NOTE — REVIEW OF SYSTEMS
[Feeling Fatigued] : feeling fatigued [Joint Pain] : joint pain [see HPI] : see HPI [Negative] : Endocrine

## 2019-04-10 NOTE — HISTORY OF PRESENT ILLNESS
[FreeTextEntry1] : 69 yo female with pmhx as below is here for a f/up visit\par 2018 admission to [facility] with adhf and a. fib\par cardioverted and sent home on med rx for hf\par recent onset palp  - afib with rvr, s/p short course of steroids by derm\par no c/o long, cp, orthopnea, pnd or le edema\par pre op for knee sx\par compliant with meds\par et is stable\par ros is otherwise as below

## 2019-04-10 NOTE — PHYSICAL EXAM
[General Appearance - Well Developed] : well developed [General Appearance - Well Nourished] : well nourished [Well Groomed] : well groomed [Normal Appearance] : normal appearance [No Deformities] : no deformities [General Appearance - In No Acute Distress] : no acute distress [No Oral Pallor] : no oral pallor [Normal Oral Mucosa] : normal oral mucosa [Normal Jugular Venous A Waves Present] : normal jugular venous A waves present [No Oral Cyanosis] : no oral cyanosis [Normal Jugular Venous V Waves Present] : normal jugular venous V waves present [No Jugular Venous Pearl A Waves] : no jugular venous pearl A waves [Respiration, Rhythm And Depth] : normal respiratory rhythm and effort [Auscultation Breath Sounds / Voice Sounds] : lungs were clear to auscultation bilaterally [Exaggerated Use Of Accessory Muscles For Inspiration] : no accessory muscle use [Heart Rate And Rhythm] : heart rate and rhythm were normal [Murmurs] : no murmurs present [Heart Sounds] : normal S1 and S2 [Abdomen Tenderness] : non-tender [Abdomen Soft] : soft [Abdomen Mass (___ Cm)] : no abdominal mass palpated [Nail Clubbing] : no clubbing of the fingernails [Cyanosis, Localized] : no localized cyanosis [Petechial Hemorrhages (___cm)] : no petechial hemorrhages [] : no ischemic changes [Skin Color & Pigmentation] : normal skin color and pigmentation [No Venous Stasis] : no venous stasis [Skin Lesions] : no skin lesions [No Skin Ulcers] : no skin ulcer [No Xanthoma] : no  xanthoma was observed

## 2019-04-10 NOTE — ASSESSMENT
[FreeTextEntry1] : 67 yo female with pmhx and presentation as above\par Hx of HF\par A.fib/HTN\par cont meds\par amio 400 bid for five days, then 200 daily\par ep consult\par delay any non urgent sx\par aggressive risk modif\par low salt diet and act as tolerated\par RTC 4-5 weeks\par

## 2019-04-12 ENCOUNTER — RX RENEWAL (OUTPATIENT)
Age: 69
End: 2019-04-12

## 2019-04-26 RX ORDER — APIXABAN 5 MG/1
5 TABLET, FILM COATED ORAL
Qty: 180 | Refills: 3 | Status: DISCONTINUED | COMMUNITY
End: 2019-04-26

## 2019-05-17 ENCOUNTER — APPOINTMENT (OUTPATIENT)
Dept: CARDIOLOGY | Facility: CLINIC | Age: 69
End: 2019-05-17
Payer: MEDICARE

## 2019-05-17 VITALS
BODY MASS INDEX: 22.16 KG/M2 | DIASTOLIC BLOOD PRESSURE: 84 MMHG | SYSTOLIC BLOOD PRESSURE: 136 MMHG | WEIGHT: 133 LBS | HEIGHT: 65 IN | HEART RATE: 62 BPM

## 2019-05-17 PROCEDURE — 93000 ELECTROCARDIOGRAM COMPLETE: CPT

## 2019-05-17 PROCEDURE — 99214 OFFICE O/P EST MOD 30 MIN: CPT

## 2019-05-17 NOTE — HISTORY OF PRESENT ILLNESS
[FreeTextEntry1] : 70 yo female with pmhx as below is here for a f/up visit\par 2018 admission to [facility] with adhf and a. fib\par cardioverted and sent home on med rx for hf\par 4/18  - afib with rvr, started on amio and ep referral given\par + allergic reaction to eliquis\par no c/o long, cp, orthopnea, pnd or le edema\par compliant with meds\par et is stable\par ros is otherwise as below

## 2019-05-17 NOTE — ASSESSMENT
[FreeTextEntry1] : 68 yo female with pmhx and presentation as above\par Hx of HF\par A.fib/HTN\par cont meds\par amio 200 daily\par xarelto 20 daily\par ep consult\par aggressive risk modif\par low salt diet and act as tolerated\par RTC 10-12 weeks\par

## 2019-05-17 NOTE — PHYSICAL EXAM
[General Appearance - Well Developed] : well developed [Normal Appearance] : normal appearance [Well Groomed] : well groomed [General Appearance - In No Acute Distress] : no acute distress [No Deformities] : no deformities [General Appearance - Well Nourished] : well nourished [No Oral Pallor] : no oral pallor [Normal Oral Mucosa] : normal oral mucosa [No Oral Cyanosis] : no oral cyanosis [Normal Jugular Venous A Waves Present] : normal jugular venous A waves present [Normal Jugular Venous V Waves Present] : normal jugular venous V waves present [No Jugular Venous Pearl A Waves] : no jugular venous pearl A waves [Exaggerated Use Of Accessory Muscles For Inspiration] : no accessory muscle use [Respiration, Rhythm And Depth] : normal respiratory rhythm and effort [Heart Rate And Rhythm] : heart rate and rhythm were normal [Auscultation Breath Sounds / Voice Sounds] : lungs were clear to auscultation bilaterally [Heart Sounds] : normal S1 and S2 [Murmurs] : no murmurs present [Abdomen Soft] : soft [Abdomen Tenderness] : non-tender [Abdomen Mass (___ Cm)] : no abdominal mass palpated [Nail Clubbing] : no clubbing of the fingernails [Cyanosis, Localized] : no localized cyanosis [Petechial Hemorrhages (___cm)] : no petechial hemorrhages [Skin Color & Pigmentation] : normal skin color and pigmentation [No Venous Stasis] : no venous stasis [] : no rash [Skin Lesions] : no skin lesions [No Skin Ulcers] : no skin ulcer [No Xanthoma] : no  xanthoma was observed

## 2019-06-03 NOTE — PROGRESS NOTE ADULT - ASSESSMENT
67y f with pmh of perforated diverticulitis s/p hartmans s/p reversal, galileo galileo disease p/w leg swelling and abd pain was found to have newly diagnosed afib and new onset chf    1.	New onset AF with RVR  2.	Ac. CHF  3.	Galileo Galileo disease       PLAN:    ·	Cont tele  ·	CV today  ·	Cont Eliquis, and cardizem  ·	Cont lasix 40mg po q24h.  ·	Prednisone PRN  ·	ECHO no weakness/no pain/no tingling/no vomiting/no nausea/no fever/no chills/no dizziness/no decreased eating/drinking

## 2019-07-02 ENCOUNTER — APPOINTMENT (OUTPATIENT)
Dept: CARDIOLOGY | Facility: CLINIC | Age: 69
End: 2019-07-02

## 2019-09-25 ENCOUNTER — APPOINTMENT (OUTPATIENT)
Dept: CARDIOLOGY | Facility: CLINIC | Age: 69
End: 2019-09-25
Payer: MEDICARE

## 2019-09-25 VITALS
HEART RATE: 75 BPM | SYSTOLIC BLOOD PRESSURE: 136 MMHG | WEIGHT: 121 LBS | DIASTOLIC BLOOD PRESSURE: 86 MMHG | BODY MASS INDEX: 20.16 KG/M2 | HEIGHT: 65 IN

## 2019-09-25 PROCEDURE — 99214 OFFICE O/P EST MOD 30 MIN: CPT

## 2019-09-25 PROCEDURE — 93000 ELECTROCARDIOGRAM COMPLETE: CPT

## 2019-09-25 NOTE — HISTORY OF PRESENT ILLNESS
[FreeTextEntry1] : 68 yo female with pmhx as below is here for a f/up visit\par 2018 admission to [facility] with adhf and a. fib\par cardioverted and sent home on med rx for hf\par 4/18  - afib with rvr, started on amio and ep referral given\par + allergic reaction to eliquis, changed to xarelto\par decided against ep consult\par no c/o long, cp, orthopnea, pnd or le edema\par non compliant with meds\par et is stable\par ros is otherwise as below

## 2019-09-25 NOTE — ASSESSMENT
[FreeTextEntry1] : 70 yo female with pmhx and presentation as above\par Hx of HF\par A.fib/HTN\par cont meds\par resume toprol xl 50\par xarelto 20 daily\par cancel ep consult, will cont with rate control and a/c\par aggressive risk modif\par low salt diet and act as tolerated\par RTC 10-12 weeks\par

## 2020-03-11 ENCOUNTER — APPOINTMENT (OUTPATIENT)
Dept: CARDIOLOGY | Facility: CLINIC | Age: 70
End: 2020-03-11

## 2020-04-04 ENCOUNTER — EMERGENCY (EMERGENCY)
Facility: HOSPITAL | Age: 70
LOS: 0 days | Discharge: HOME | End: 2020-04-04
Attending: EMERGENCY MEDICINE | Admitting: EMERGENCY MEDICINE
Payer: MEDICARE

## 2020-04-04 VITALS
HEART RATE: 134 BPM | RESPIRATION RATE: 20 BRPM | TEMPERATURE: 96 F | DIASTOLIC BLOOD PRESSURE: 95 MMHG | SYSTOLIC BLOOD PRESSURE: 193 MMHG

## 2020-04-04 VITALS — DIASTOLIC BLOOD PRESSURE: 89 MMHG | SYSTOLIC BLOOD PRESSURE: 139 MMHG | HEART RATE: 97 BPM

## 2020-04-04 DIAGNOSIS — Z79.899 OTHER LONG TERM (CURRENT) DRUG THERAPY: ICD-10-CM

## 2020-04-04 DIAGNOSIS — Z79.52 LONG TERM (CURRENT) USE OF SYSTEMIC STEROIDS: ICD-10-CM

## 2020-04-04 DIAGNOSIS — R07.89 OTHER CHEST PAIN: ICD-10-CM

## 2020-04-04 DIAGNOSIS — K94.09 OTHER COMPLICATIONS OF COLOSTOMY: Chronic | ICD-10-CM

## 2020-04-04 DIAGNOSIS — Z79.01 LONG TERM (CURRENT) USE OF ANTICOAGULANTS: ICD-10-CM

## 2020-04-04 DIAGNOSIS — Z93.3 COLOSTOMY STATUS: Chronic | ICD-10-CM

## 2020-04-04 DIAGNOSIS — I10 ESSENTIAL (PRIMARY) HYPERTENSION: ICD-10-CM

## 2020-04-04 DIAGNOSIS — I48.91 UNSPECIFIED ATRIAL FIBRILLATION: ICD-10-CM

## 2020-04-04 LAB
ALBUMIN SERPL ELPH-MCNC: 4.6 G/DL — SIGNIFICANT CHANGE UP (ref 3.5–5.2)
ALP SERPL-CCNC: 83 U/L — SIGNIFICANT CHANGE UP (ref 30–115)
ALT FLD-CCNC: 102 U/L — HIGH (ref 0–41)
ANION GAP SERPL CALC-SCNC: 15 MMOL/L — HIGH (ref 7–14)
AST SERPL-CCNC: 112 U/L — HIGH (ref 0–41)
BILIRUB SERPL-MCNC: 1.2 MG/DL — SIGNIFICANT CHANGE UP (ref 0.2–1.2)
BUN SERPL-MCNC: 34 MG/DL — HIGH (ref 10–20)
CALCIUM SERPL-MCNC: 10.1 MG/DL — SIGNIFICANT CHANGE UP (ref 8.5–10.1)
CHLORIDE SERPL-SCNC: 103 MMOL/L — SIGNIFICANT CHANGE UP (ref 98–110)
CO2 SERPL-SCNC: 21 MMOL/L — SIGNIFICANT CHANGE UP (ref 17–32)
CREAT SERPL-MCNC: 0.9 MG/DL — SIGNIFICANT CHANGE UP (ref 0.7–1.5)
D DIMER BLD IA.RAPID-MCNC: 1264 NG/ML DDU — HIGH (ref 0–230)
GLUCOSE SERPL-MCNC: 136 MG/DL — HIGH (ref 70–99)
HCT VFR BLD CALC: 46.5 % — SIGNIFICANT CHANGE UP (ref 37–47)
HGB BLD-MCNC: 15.6 G/DL — SIGNIFICANT CHANGE UP (ref 12–16)
MCHC RBC-ENTMCNC: 31.6 PG — HIGH (ref 27–31)
MCHC RBC-ENTMCNC: 33.5 G/DL — SIGNIFICANT CHANGE UP (ref 32–37)
MCV RBC AUTO: 94.3 FL — SIGNIFICANT CHANGE UP (ref 81–99)
NRBC # BLD: 0 /100 WBCS — SIGNIFICANT CHANGE UP (ref 0–0)
NT-PROBNP SERPL-SCNC: 6915 PG/ML — HIGH (ref 0–300)
PLATELET # BLD AUTO: 225 K/UL — SIGNIFICANT CHANGE UP (ref 130–400)
POTASSIUM SERPL-MCNC: 4.4 MMOL/L — SIGNIFICANT CHANGE UP (ref 3.5–5)
POTASSIUM SERPL-SCNC: 4.4 MMOL/L — SIGNIFICANT CHANGE UP (ref 3.5–5)
PROT SERPL-MCNC: 7 G/DL — SIGNIFICANT CHANGE UP (ref 6–8)
RBC # BLD: 4.93 M/UL — SIGNIFICANT CHANGE UP (ref 4.2–5.4)
RBC # FLD: 13.1 % — SIGNIFICANT CHANGE UP (ref 11.5–14.5)
SODIUM SERPL-SCNC: 139 MMOL/L — SIGNIFICANT CHANGE UP (ref 135–146)
TROPONIN T SERPL-MCNC: <0.01 NG/ML — SIGNIFICANT CHANGE UP
WBC # BLD: 8.87 K/UL — SIGNIFICANT CHANGE UP (ref 4.8–10.8)
WBC # FLD AUTO: 8.87 K/UL — SIGNIFICANT CHANGE UP (ref 4.8–10.8)

## 2020-04-04 PROCEDURE — 71275 CT ANGIOGRAPHY CHEST: CPT | Mod: 26

## 2020-04-04 PROCEDURE — 99285 EMERGENCY DEPT VISIT HI MDM: CPT

## 2020-04-04 PROCEDURE — 93010 ELECTROCARDIOGRAM REPORT: CPT

## 2020-04-04 PROCEDURE — 71045 X-RAY EXAM CHEST 1 VIEW: CPT | Mod: 26

## 2020-04-04 RX ORDER — DILTIAZEM HCL 120 MG
20 CAPSULE, EXT RELEASE 24 HR ORAL ONCE
Refills: 0 | Status: COMPLETED | OUTPATIENT
Start: 2020-04-04 | End: 2020-04-04

## 2020-04-04 RX ORDER — DILTIAZEM HCL 120 MG
120 CAPSULE, EXT RELEASE 24 HR ORAL ONCE
Refills: 0 | Status: COMPLETED | OUTPATIENT
Start: 2020-04-04 | End: 2020-04-04

## 2020-04-04 RX ORDER — DILTIAZEM HCL 120 MG
120 CAPSULE, EXT RELEASE 24 HR ORAL ONCE
Refills: 0 | Status: DISCONTINUED | OUTPATIENT
Start: 2020-04-04 | End: 2020-04-04

## 2020-04-04 RX ORDER — DILTIAZEM HCL 120 MG
30 CAPSULE, EXT RELEASE 24 HR ORAL ONCE
Refills: 0 | Status: DISCONTINUED | OUTPATIENT
Start: 2020-04-04 | End: 2020-04-04

## 2020-04-04 RX ORDER — DILTIAZEM HCL 120 MG
1 CAPSULE, EXT RELEASE 24 HR ORAL
Qty: 30 | Refills: 0
Start: 2020-04-04 | End: 2020-05-03

## 2020-04-04 RX ORDER — DILTIAZEM HCL 120 MG
1 CAPSULE, EXT RELEASE 24 HR ORAL
Qty: 30 | Refills: 0
Start: 2020-04-04 | End: 2020-05-04

## 2020-04-04 RX ORDER — SODIUM CHLORIDE 9 MG/ML
1000 INJECTION, SOLUTION INTRAVENOUS ONCE
Refills: 0 | Status: DISCONTINUED | OUTPATIENT
Start: 2020-04-04 | End: 2020-04-04

## 2020-04-04 RX ADMIN — Medication 20 MILLIGRAM(S): at 15:54

## 2020-04-04 RX ADMIN — Medication 20 MILLIGRAM(S): at 21:29

## 2020-04-04 RX ADMIN — Medication 120 MILLIGRAM(S): at 18:30

## 2020-04-04 NOTE — ED PROVIDER NOTE - PROGRESS NOTE DETAILS
Pt feeling improved, rate controlled with 20 cardizem, hr 80-90's afib. Discussed with Dr. Sethi, feels comfortable with patient being started on cardizem for rate control at home and Xarelto 20 mg if patient is amenable to starting anticoagulation. Will await labs/imaging and discuss with patient. pt received as so from dr galindo, who states she dw dr sullivan who recommends xarleto and cardizem, malpesso aware that pt may decline xarelto. pt offered xarelto, risk benefit discussion had with patient who states she understands the risks and does not want to be on blood thinner. cardizem sent to lab. zw rate controlled in the ed. patient refusing to stay in the hospital. pt will see Dr. Rodriguez over the next 48 hrs. Full DC instructions discussed and patient knows when to seek immediate medical attention. Patient has proper follow-up. All results discussed with the patient they may require further work-up. Limitations of ED work-up discussed. All  questions and concerns from patient or family addressed. Understanding of insturctions verbalized

## 2020-04-04 NOTE — ED PROVIDER NOTE - ATTENDING CONTRIBUTION TO CARE
70 y/o F pmh HTN, AFIB not on AC 2/2 allergy to NOAC, HTN, p/w progressive sob and chest discomfort x about 1 wk. + subjective fever chills. No v/d, cough. had neg COVID test. here bc sob worse. No Hx VTE. In ED pt is in AFIB RVR, o/w exam unremarkable. + mild hypoxia 94-95% noted IMP: AFIB, consider COVID vs PNA; PE seems unlikely. P: Labs, cxr, ivf , rate control, ekg, reassess.

## 2020-04-04 NOTE — ED PROVIDER NOTE - PATIENT PORTAL LINK FT
You can access the FollowMyHealth Patient Portal offered by [facility] by registering at the following website: [email or web address]. By joining [facility]’s FollowMyHealth portal, you will also be able to view your health information using other applications (apps) compatible with our system.

## 2020-04-04 NOTE — ED ADULT NURSE REASSESSMENT NOTE - NS ED NURSE REASSESS COMMENT FT1
Received pt from CANDELARIO Carlson to cover her lunch break. pt is awake, A7Ox3. No sign of distress or discomfort. pt aware of plan of care and has no questions or concerns at this time. Safety maintained, will continue to monitor

## 2020-04-04 NOTE — ED PROVIDER NOTE - OBJECTIVE STATEMENT
69 y F pmh afib not on anticoagulation, htn pw chest pain and sob. CP and SOB for the past few days associated with low grade fever which has resolved. SOB worsened today so she came in. No alleviating/exacerbating factors. Is supposed to be on anticoagulation and was previously on eliquis but had allergic reaction so decided to stop taking blood thinners. Denies n/v, diarrhea, abd pain, back pain, headache. No hx blood clot/pe, no recent travel/immobilization, no recent surgery, not on hormone/ocp therapy. Had covid test which was negative.

## 2020-04-04 NOTE — ED PROVIDER NOTE - CARE PROVIDER_API CALL
Candido Rodriguez)  Cardiovascular Disease; Internal Medicine; Interventional Cardiology; Nuclear Cardiology  [address] [place] [address]  Phone: [phone]  Fax: [phone]  Follow Up Time:

## 2020-04-04 NOTE — ED PROVIDER NOTE - NS ED ROS FT
Eyes:  No visual changes, eye pain or discharge.  ENMT:  No hearing changes, pain, discharge or infections. No neck pain or stiffness.  Cardiac: CP, sob. No edema. No chest pain with exertion.  Respiratory: +Sob. No cough or respiratory distress. No hemoptysis. No history of asthma or RAD.  GI:  No nausea, vomiting, diarrhea or abdominal pain.  :  No dysuria, frequency or burning.  MS:  No myalgia, muscle weakness, joint pain or back pain.  Neuro:  No headache or weakness.  No LOC.  Skin:  No skin rash.   Endocrine: No history of thyroid disease or diabetes.

## 2020-04-04 NOTE — ED PROVIDER NOTE - PHYSICAL EXAMINATION
CONSTITUTIONAL: Well-developed; well-nourished; in no acute distress.   SKIN: warm, dry  HEAD: Normocephalic; atraumatic.  EYES: PERRL, EOMI, normal sclera and conjunctiva   ENT: No nasal discharge; airway clear.  NECK: Supple; non tender.  CARD: S1, S2 normal; no murmurs, gallops, or rubs. Tachycardia, irregular.   RESP: No wheezes, rales or rhonchi.  ABD: soft ntnd  EXT: Normal ROM.  No clubbing, cyanosis or edema. No posterior calf ttp.   LYMPH: No acute cervical adenopathy.  NEURO: Alert, oriented, grossly unremarkable  PSYCH: Cooperative, appropriate.

## 2020-04-04 NOTE — ED ADULT NURSE REASSESSMENT NOTE - NS ED NURSE REASSESS COMMENT FT1
pt alert and oriented x4, denies pain. awaiting CT results. no respiratory distress noted at this time. afib noted on monitor,

## 2020-04-04 NOTE — ED ADULT NURSE NOTE - OBJECTIVE STATEMENT
patient alert and oriented x4, complaining of SOB x3 weeks. states she has palpitations and mild chest tightness. pt 92% on RA

## 2020-04-04 NOTE — ED PROVIDER NOTE - NSFOLLOWUPINSTRUCTIONS_ED_ALL_ED_FT
Is This A New Presentation, Or A Follow-Up?: Skin Lesions How Severe Is Your Skin Lesion?: mild Have Your Skin Lesions Been Treated?: not been treated Follow up with your primary care doctor and/or the doctors recommended in 1-3 days    Atrial Fibrillation    Atrial fibrillation is a type of irregular heartbeat (arrhythmia) where the heart quivers continuously in a chaotic pattern that makes the heart unable to pump blood normally. This can increase the risk for stroke, heart failure, and other heart-related conditions. Atrial fibrillation can be caused by a variety of conditions and may be temporary, intermittent, or permanent. Symptoms include feeling that your heart is beating rapidly or irregularly, chest discomfort, shortness of breath, or dizziness/lightheadedness that may be worse with exertion. Treatment is varied but may involve medication or electrical shock (cardioversion).    SEEK IMMEDIATE MEDICAL CARE IF YOU HAVE ANY OF THE FOLLOWING SYMPTOMS: chest pain, shortness of breath, abdominal pain, sweating, vomiting, blood in vomit/bowel movements/urine, dizziness/lightheadedness, weakness or numbness to face/arm/leg, trouble speaking or understanding, facial droop.

## 2020-04-07 ENCOUNTER — APPOINTMENT (OUTPATIENT)
Dept: CARDIOLOGY | Facility: CLINIC | Age: 70
End: 2020-04-07
Payer: MEDICARE

## 2020-04-07 ENCOUNTER — RX RENEWAL (OUTPATIENT)
Age: 70
End: 2020-04-07

## 2020-04-07 PROCEDURE — 99214 OFFICE O/P EST MOD 30 MIN: CPT | Mod: 95

## 2020-04-07 RX ORDER — HYDROCHLOROTHIAZIDE 12.5 MG/1
12.5 TABLET ORAL
Qty: 90 | Refills: 3 | Status: DISCONTINUED | COMMUNITY
Start: 2018-05-09 | End: 2020-04-07

## 2020-04-07 NOTE — HISTORY OF PRESENT ILLNESS
[FreeTextEntry1] : 70 yo female with pmhx as below\par 2018 admission to [facility] with adhf and a. fib\par cardioverted and sent home on med rx for hf\par 4/18  - afib with rvr, stopped amio, bb and eliquis\par decided against ep consult\par 4/20 visit to ed with chf, started on ccb and sent home with card f/up\par +  long, orthopnea, pnd, no cp/le edema\par non compliant with meds\par et is stable\par ros is otherwise as below

## 2020-04-07 NOTE — REASON FOR VISIT
[Follow-Up - Clinic] : a clinic follow-up of [Atrial Fibrillation] : atrial fibrillation [Cardiomyopathy] : cardiomyopathy [Heart Failure] : congestive heart failure [FreeTextEntry2] : tele visit

## 2020-04-07 NOTE — ASSESSMENT
[FreeTextEntry1] : 68 yo female with pmhx and presentation as above\par CHF\par A.fib/HTN\par cont meds\par increase ccb to 240\par stop hctz and start lasix\par avoid salt \par will discuss long term a/c - xarelto 20 daily, if not then asa\par aggressive risk modif\par low salt diet and act as tolerated\par RTC 2 weeks\par \par TELE health visit, spent over 25 minutes on pt care\par Pt verbally consented to the visit\par

## 2020-04-07 NOTE — PHYSICAL EXAM
[General Appearance - Well Developed] : well developed [Normal Appearance] : normal appearance [Well Groomed] : well groomed [General Appearance - Well Nourished] : well nourished [General Appearance - In No Acute Distress] : no acute distress [Normal Jugular Venous A Waves Present] : normal jugular venous A waves present [Normal Jugular Venous V Waves Present] : normal jugular venous V waves present [No Jugular Venous Pearl A Waves] : no jugular venous pearl A waves [Skin Color & Pigmentation] : normal skin color and pigmentation [] : no rash [No Venous Stasis] : no venous stasis [Skin Lesions] : no skin lesions [No Skin Ulcers] : no skin ulcer [No Xanthoma] : no  xanthoma was observed

## 2020-04-17 ENCOUNTER — APPOINTMENT (OUTPATIENT)
Dept: CARDIOLOGY | Facility: CLINIC | Age: 70
End: 2020-04-17
Payer: MEDICARE

## 2020-04-17 PROCEDURE — 99214 OFFICE O/P EST MOD 30 MIN: CPT | Mod: 95

## 2020-04-17 NOTE — ASSESSMENT
[FreeTextEntry1] : 70 yo female with pmhx and presentation as above\par CHF\par A.fib/HTN\par bp and heart rate much better\par cont meds\par cont ccb to 240\par take  lasix qod, monitor for s/s of fluid overload\par avoid salt \par long term a/c discussed had reaction to eliquis and  xarelto, for now pt was asked to take asa\par aggressive risk modif\par low salt diet and act as tolerated\par RTC 6 weeks\par \par TELE health visit, spent over 25 minutes on pt care\par Pt verbally consented to the visit\par

## 2020-04-17 NOTE — HISTORY OF PRESENT ILLNESS
[FreeTextEntry1] : 68 yo female with pmhx as below\par 2018 admission to [facility] with adhf and a. fib\par cardioverted and sent home on med rx for hf\par 4/18  - afib with rvr, stopped amio, bb and eliquis\par decided against ep consult\par 4/20 visit to ed with chf, started on ccb and sent home with card f/up\par started on ccb and lasix, symptoms much better\par no long, orthopnea, pnd, cp/le edema\par non compliant with meds\par et is stable\par ros is otherwise as below

## 2020-07-01 ENCOUNTER — RX RENEWAL (OUTPATIENT)
Age: 70
End: 2020-07-01

## 2020-07-15 NOTE — ED PROVIDER NOTE - CLINICAL SUMMARY MEDICAL DECISION MAKING FREE TEXT BOX
normal appearance, without tenderness upon palpation, no deformities, no cervical lymphadenopathy, no masses, no thyroid nodules, Thyroid normal size, no JVD, thyroid nontender
rate controlled in the ed. patient refusing to stay in the hospital. pt will see Dr. Rodriguez over the next 48 hrs. Full DC instructions discussed and patient knows when to seek immediate medical attention. Patient has proper follow-up. All results discussed with the patient they may require further work-up. Limitations of ED work-up discussed. All  questions and concerns from patient or family addressed. Understanding of insturctions verbalized

## 2020-07-21 ENCOUNTER — RX RENEWAL (OUTPATIENT)
Age: 70
End: 2020-07-21

## 2020-08-07 ENCOUNTER — APPOINTMENT (OUTPATIENT)
Dept: CARDIOLOGY | Facility: CLINIC | Age: 70
End: 2020-08-07

## 2020-08-08 ENCOUNTER — APPOINTMENT (OUTPATIENT)
Dept: CARDIOLOGY | Facility: CLINIC | Age: 70
End: 2020-08-08

## 2020-08-13 ENCOUNTER — APPOINTMENT (OUTPATIENT)
Dept: CARDIOLOGY | Facility: CLINIC | Age: 70
End: 2020-08-13

## 2020-08-20 ENCOUNTER — RX RENEWAL (OUTPATIENT)
Age: 70
End: 2020-08-20

## 2020-09-18 ENCOUNTER — RX RENEWAL (OUTPATIENT)
Age: 70
End: 2020-09-18

## 2020-09-23 ENCOUNTER — APPOINTMENT (OUTPATIENT)
Dept: CARDIOLOGY | Facility: CLINIC | Age: 70
End: 2020-09-23
Payer: MEDICARE

## 2020-09-23 PROCEDURE — 93306 TTE W/DOPPLER COMPLETE: CPT

## 2020-10-21 ENCOUNTER — APPOINTMENT (OUTPATIENT)
Dept: CARDIOLOGY | Facility: CLINIC | Age: 70
End: 2020-10-21
Payer: MEDICARE

## 2020-10-21 VITALS
WEIGHT: 126 LBS | SYSTOLIC BLOOD PRESSURE: 138 MMHG | TEMPERATURE: 97.6 F | HEIGHT: 65 IN | HEART RATE: 89 BPM | DIASTOLIC BLOOD PRESSURE: 72 MMHG | BODY MASS INDEX: 20.99 KG/M2

## 2020-10-21 PROCEDURE — 99214 OFFICE O/P EST MOD 30 MIN: CPT

## 2020-10-21 PROCEDURE — 93000 ELECTROCARDIOGRAM COMPLETE: CPT

## 2020-10-21 RX ORDER — DILTIAZEM HYDROCHLORIDE 240 MG/1
240 CAPSULE, EXTENDED RELEASE ORAL
Qty: 90 | Refills: 0 | Status: DISCONTINUED | COMMUNITY
Start: 2020-04-05 | End: 2020-10-21

## 2020-10-21 NOTE — PHYSICAL EXAM
[General Appearance - Well Developed] : well developed [Normal Appearance] : normal appearance [Well Groomed] : well groomed [General Appearance - Well Nourished] : well nourished [General Appearance - In No Acute Distress] : no acute distress [Normal Oral Mucosa] : normal oral mucosa [No Oral Pallor] : no oral pallor [No Oral Cyanosis] : no oral cyanosis [Normal Jugular Venous A Waves Present] : normal jugular venous A waves present [Normal Jugular Venous V Waves Present] : normal jugular venous V waves present [No Jugular Venous Pearl A Waves] : no jugular venous pearl A waves [Respiration, Rhythm And Depth] : normal respiratory rhythm and effort [Exaggerated Use Of Accessory Muscles For Inspiration] : no accessory muscle use [Auscultation Breath Sounds / Voice Sounds] : lungs were clear to auscultation bilaterally [Heart Sounds] : normal S1 and S2 [Irregularly Irregular] : the rhythm was irregularly irregular [Systolic grade ___/6] : A grade [unfilled]/6 systolic murmur was heard. [Abdomen Soft] : soft [Abdomen Tenderness] : non-tender [Abdomen Mass (___ Cm)] : no abdominal mass palpated [Nail Clubbing] : no clubbing of the fingernails [Cyanosis, Localized] : no localized cyanosis [Petechial Hemorrhages (___cm)] : no petechial hemorrhages [Skin Color & Pigmentation] : normal skin color and pigmentation [] : no rash [No Venous Stasis] : no venous stasis [Skin Lesions] : no skin lesions [No Skin Ulcers] : no skin ulcer [No Xanthoma] : no  xanthoma was observed [Oriented To Time, Place, And Person] : oriented to person, place, and time

## 2020-10-22 NOTE — ASSESSMENT
[FreeTextEntry1] : 69 yo female with pmhx and presentation as above\par CHF\par A.fib/HTN\par 2d ehco reviewed, results d/w the pt and her son, OUMOU/CV and EP consults strongly recommended, pt refused\par cont meds\par stop ccb and start coreg 12.5 bid\par take  lasix qd, monitor for s/s of fluid overload\par avoid salt \par long term a/c discussed, had reaction to eliquis and  xarelto, refusing to take them, for now pt was asked to take asa\par aggressive risk modif\par low salt diet and act as tolerated\par RTC 4- 6 weeks\par

## 2020-10-22 NOTE — HISTORY OF PRESENT ILLNESS
[FreeTextEntry1] : 71 yo female with pmhx as below\par 2018 admission to [facility] with adhf and a. fib\par cardioverted and sent home on med rx for hf\par 4/18  - afib with rvr, stopped amio, bb and eliquis\par decided against ep consult\par 4/20 visit to ed with chf, started on ccb and sent home with card f/up\par on ccb and lasix, symptoms much better\par repeat echo - severe lv dysfnx with MR\par no long, orthopnea, pnd, cp, on and off le edema\par non compliant with meds\par et is stable\par ros is otherwise as below

## 2020-10-22 NOTE — REASON FOR VISIT
[Follow-Up - Clinic] : a clinic follow-up of [Atrial Fibrillation] : atrial fibrillation [Cardiomyopathy] : cardiomyopathy [Heart Failure] : congestive heart failure [FreeTextEntry2] : f/up for cm/a.fib

## 2020-11-24 ENCOUNTER — APPOINTMENT (OUTPATIENT)
Dept: CARDIOLOGY | Facility: CLINIC | Age: 70
End: 2020-11-24
Payer: MEDICARE

## 2020-11-24 PROCEDURE — 99213 OFFICE O/P EST LOW 20 MIN: CPT | Mod: 95

## 2020-11-24 RX ORDER — CARVEDILOL 12.5 MG/1
12.5 TABLET, FILM COATED ORAL TWICE DAILY
Qty: 180 | Refills: 3 | Status: DISCONTINUED | COMMUNITY
Start: 2020-10-21 | End: 2020-11-24

## 2020-11-24 NOTE — REASON FOR VISIT
[Follow-Up - Clinic] : a clinic follow-up of [Atrial Fibrillation] : atrial fibrillation [Cardiomyopathy] : cardiomyopathy [Heart Failure] : congestive heart failure [FreeTextEntry2] : f/up for cm/a.fib - tele visit

## 2020-11-24 NOTE — ASSESSMENT
[FreeTextEntry1] : 69 yo female with pmhx and presentation as above\par CHF\par A.fib/HTN\par  OUMOU/CV and EP consults strongly recommended, pt agreeing to see Dr. Orozco, will set it up\par cont meds\par increase bb to 50 bis\par take  lasix qd, monitor for s/s of fluid overload\par avoid salt \par long term a/c discussed, had reaction to eliquis and  xarelto, refusing to take them, for now pt was asked to stay on asa\par aggressive risk modif\par low salt diet and act as tolerated\par RTC 8-10 weeks\par

## 2020-11-24 NOTE — HISTORY OF PRESENT ILLNESS
[FreeTextEntry1] : 71 yo female with pmhx as below\par 2018 admission to [facility] with adhf and a. fib\par cardioverted and sent home on med rx for hf\par 4/18  - afib with rvr, stopped amio, bb and eliquis\par decided against ep consult\par 4/20 visit to ed with chf, started on ccb and sent home with card f/up\par on ccb and lasix, symptoms much better\par 2020 repeat echo - severe lv dysfnx with MR, ccb was disc-d and started on bb\par hr 70-110s last few weeks\par no long, orthopnea, pnd, cp, on and off le edema\par compliant with meds\par et is stable\par ros is otherwise as below

## 2020-11-24 NOTE — PHYSICAL EXAM
[General Appearance - Well Developed] : well developed [Normal Appearance] : normal appearance [Well Groomed] : well groomed [General Appearance - Well Nourished] : well nourished [General Appearance - In No Acute Distress] : no acute distress [Normal Jugular Venous A Waves Present] : normal jugular venous A waves present [Normal Jugular Venous V Waves Present] : normal jugular venous V waves present [No Jugular Venous Pearl A Waves] : no jugular venous pearl A waves [Skin Color & Pigmentation] : normal skin color and pigmentation [] : no rash [No Venous Stasis] : no venous stasis [Skin Lesions] : no skin lesions [No Skin Ulcers] : no skin ulcer [No Xanthoma] : no  xanthoma was observed [Oriented To Time, Place, And Person] : oriented to person, place, and time

## 2020-12-02 NOTE — H&P ADULT - NSHPPOADEEPVENOUSTHROMB_GEN_A_CORE
"no

## 2020-12-08 ENCOUNTER — APPOINTMENT (OUTPATIENT)
Dept: CARDIOLOGY | Facility: CLINIC | Age: 70
End: 2020-12-08
Payer: MEDICARE

## 2020-12-08 VITALS
SYSTOLIC BLOOD PRESSURE: 126 MMHG | HEART RATE: 118 BPM | WEIGHT: 128 LBS | DIASTOLIC BLOOD PRESSURE: 80 MMHG | TEMPERATURE: 97.3 F | HEIGHT: 66 IN | BODY MASS INDEX: 20.57 KG/M2

## 2020-12-08 DIAGNOSIS — I48.91 UNSPECIFIED ATRIAL FIBRILLATION: ICD-10-CM

## 2020-12-08 DIAGNOSIS — Z83.1 FAMILY HISTORY OF OTHER INFECTIOUS AND PARASITIC DISEASES: ICD-10-CM

## 2020-12-08 DIAGNOSIS — Z80.8 FAMILY HISTORY OF MALIGNANT NEOPLASM OF OTHER ORGANS OR SYSTEMS: ICD-10-CM

## 2020-12-08 DIAGNOSIS — Z82.49 FAMILY HISTORY OF ISCHEMIC HEART DISEASE AND OTHER DISEASES OF THE CIRCULATORY SYSTEM: ICD-10-CM

## 2020-12-08 LAB
ALBUMIN SERPL ELPH-MCNC: 4.2 G/DL
ALP BLD-CCNC: 81 U/L
ALT SERPL-CCNC: 26 U/L
ANION GAP SERPL CALC-SCNC: 11 MMOL/L
APTT BLD: 31.8 SEC
AST SERPL-CCNC: 38 U/L
BASOPHILS # BLD AUTO: 0.04 K/UL
BASOPHILS NFR BLD AUTO: 0.6 %
BILIRUB SERPL-MCNC: 0.9 MG/DL
BUN SERPL-MCNC: 23 MG/DL
CALCIUM SERPL-MCNC: 9.5 MG/DL
CHLORIDE SERPL-SCNC: 106 MMOL/L
CO2 SERPL-SCNC: 25 MMOL/L
CREAT SERPL-MCNC: 0.9 MG/DL
EOSINOPHIL # BLD AUTO: 0.12 K/UL
EOSINOPHIL NFR BLD AUTO: 1.7 %
GLUCOSE SERPL-MCNC: 92 MG/DL
HCT VFR BLD CALC: 39.3 %
HGB BLD-MCNC: 12.8 G/DL
IMM GRANULOCYTES NFR BLD AUTO: 0.4 %
INR PPP: 1.08 RATIO
LYMPHOCYTES # BLD AUTO: 1.48 K/UL
LYMPHOCYTES NFR BLD AUTO: 20.6 %
MAN DIFF?: NORMAL
MCHC RBC-ENTMCNC: 30.7 PG
MCHC RBC-ENTMCNC: 32.6 G/DL
MCV RBC AUTO: 94.2 FL
MONOCYTES # BLD AUTO: 0.6 K/UL
MONOCYTES NFR BLD AUTO: 8.4 %
NEUTROPHILS # BLD AUTO: 4.91 K/UL
NEUTROPHILS NFR BLD AUTO: 68.3 %
PLATELET # BLD AUTO: 172 K/UL
POTASSIUM SERPL-SCNC: 4.1 MMOL/L
PROT SERPL-MCNC: 6.4 G/DL
PT BLD: 12.4 SEC
RBC # BLD: 4.17 M/UL
RBC # FLD: 12.6 %
SODIUM SERPL-SCNC: 142 MMOL/L
WBC # FLD AUTO: 7.18 K/UL

## 2020-12-08 PROCEDURE — 99072 ADDL SUPL MATRL&STAF TM PHE: CPT

## 2020-12-08 PROCEDURE — 93000 ELECTROCARDIOGRAM COMPLETE: CPT

## 2020-12-08 PROCEDURE — 99204 OFFICE O/P NEW MOD 45 MIN: CPT

## 2020-12-08 NOTE — DISCUSSION/SUMMARY
[FreeTextEntry1] : Ms. 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 Her CHADSVASC score is 4 and she is on anticoagulation due to cutaneous bleed. She takes aspirin intermittently. AF started in March 2018, s/p DCCV and Amiodarone therapy for ~2 months. AF recurred in September 2019. Her EF worsens when in AF.\par \par I discussed with patient the different treatment options for atrial fibrillation focusing on stroke prevention. I discussed with patient the risk of stroke based on CHADSVASC score and the options to reduce that risk. Patient has no signs of bleeding at this time. I discussed the options of DOACs, Warfarin, and anti-platelets including risks and benefits of each option. Patient expressed understanding of discussion and agreed for anticoagulation with Xarelto. I educated patient on monitoring for signs of bleeding including blood in urine, blood in stool, significant dizziness or weakness, low blood pressure, or feeling pale. I educated patient on stopping anticoagulation pills in case of bleeding and calling my office to notify me. I will refer patient for blood work to establish baseline parameters and I will follow with repeat blood work in 2 weeks. \par \par The management strategies for atrial fibrillation were discussed: the options of rate control, antiarrhythmic drug therapy, and electrophysiologic testing and catheter ablation therapy were discussed at length. As she is not keen on long term antiarrhythmic medication, she is a good candidate for catheter ablation of atrial fibrillation in the form of pulmonary vein isolation. I have discussed the procedure with her in great details. She was told this procedure is performed under anesthesia with the duration of about four hours. Possible complications include but not limited to bleeding, vascular injury, groin complications, cardiac tamponade, stroke, esophageal injury, pulmonary vein stenosis, need for pacemaker, need for cardiac surgery, and rare risks of esophageal fistula/stroke/heart attack/death. Intracardiac echo is used to monitor the procedure. In addition, we use a temperature probe in the esophagus to prevent lesions. The success rate is in the range 70-80%. About 20% of patients require a second procedure for pulmonary vein reconnection. \par \par Procedure will be planned on 2/3/2021.\par \par She verbalized understanding of the discussion and all questions were addressed and answered. She expressed agreement in proceeding with EP study and ablation. My  will be in contact with her for finalizing date, preadmission testing and instructions. Patient will follow with me in 2 months’ time. Please do not hesitate to contact me at [phone] if you have any further questions regarding this patient care.

## 2020-12-08 NOTE — HISTORY OF PRESENT ILLNESS
[FreeTextEntry1] : Referring Physician: Dr. Candido Rodriguez\par \par March 2018: diagnosed with new-onset atrial fibrillation and atrial flutter. underwent OUMOU/DCCV unsuccessful, followed by conversion to sinus on Amiodarone. took Amiodarone for ~2 months.\par EF was low when in AF with RVR. improved after DC cardioversion\par Had severe skin reaction to Eliquis; followed by skin bleeding and discontinuation of DOAC\par September 2019: recurrence of AF with RVR and worsening EF. treated with rate control.\par EF worsened.\par She has no chest pain, no shortness of breath at rest, no orthopnea, no PND. She denies dizziness, lightheadedness and syncope. She has mild exertional symptoms. She presents for evaluation.\par \par \par CARDIAC TESTING:\par ECG (12/8/2020): Atrial Fibrillation at 118 bpm, non-sp T wave abnormalities.\par Echo (9/23/2020): EF 25-30%; RA mod dilated, LA severely dilated.

## 2020-12-08 NOTE — PHYSICAL EXAM
[General Appearance - Well Developed] : well developed [Normal Appearance] : normal appearance [Well Groomed] : well groomed [General Appearance - Well Nourished] : well nourished [No Deformities] : no deformities [General Appearance - In No Acute Distress] : no acute distress [Normal Conjunctiva] : the conjunctiva exhibited no abnormalities [Eyelids - No Xanthelasma] : the eyelids demonstrated no xanthelasmas [Normal Oral Mucosa] : normal oral mucosa [No Oral Pallor] : no oral pallor [No Oral Cyanosis] : no oral cyanosis [Normal Jugular Venous A Waves Present] : normal jugular venous A waves present [Normal Jugular Venous V Waves Present] : normal jugular venous V waves present [No Jugular Venous Pearl A Waves] : no jugular venous pearl A waves [Heart Rate And Rhythm] : heart rate and rhythm were normal [Heart Sounds] : normal S1 and S2 [Murmurs] : no murmurs present [Respiration, Rhythm And Depth] : normal respiratory rhythm and effort [Exaggerated Use Of Accessory Muscles For Inspiration] : no accessory muscle use [Auscultation Breath Sounds / Voice Sounds] : lungs were clear to auscultation bilaterally [Abdomen Soft] : soft [Abdomen Tenderness] : non-tender [Abdomen Mass (___ Cm)] : no abdominal mass palpated [Abnormal Walk] : normal gait [Gait - Sufficient For Exercise Testing] : the gait was sufficient for exercise testing [Nail Clubbing] : no clubbing of the fingernails [Cyanosis, Localized] : no localized cyanosis [Petechial Hemorrhages (___cm)] : no petechial hemorrhages [Skin Color & Pigmentation] : normal skin color and pigmentation [] : no rash [No Venous Stasis] : no venous stasis [Skin Lesions] : no skin lesions [No Skin Ulcers] : no skin ulcer [No Xanthoma] : no  xanthoma was observed [Oriented To Time, Place, And Person] : oriented to person, place, and time [Affect] : the affect was normal [Mood] : the mood was normal [No Anxiety] : not feeling anxious

## 2020-12-21 LAB
T4 FREE SERPL-MCNC: 1.3 NG/DL
T4 SERPL-MCNC: 6.6 UG/DL
TSH SERPL-ACNC: 0.88 UIU/ML

## 2020-12-22 ENCOUNTER — LABORATORY RESULT (OUTPATIENT)
Age: 70
End: 2020-12-22

## 2021-01-20 ENCOUNTER — RESULT REVIEW (OUTPATIENT)
Age: 71
End: 2021-01-20

## 2021-01-20 ENCOUNTER — OUTPATIENT (OUTPATIENT)
Dept: OUTPATIENT SERVICES | Facility: HOSPITAL | Age: 71
LOS: 1 days | Discharge: HOME | End: 2021-01-20
Payer: MEDICARE

## 2021-01-20 VITALS
TEMPERATURE: 97 F | WEIGHT: 123.9 LBS | RESPIRATION RATE: 16 BRPM | OXYGEN SATURATION: 100 % | HEART RATE: 86 BPM | HEIGHT: 66 IN | SYSTOLIC BLOOD PRESSURE: 150 MMHG | DIASTOLIC BLOOD PRESSURE: 88 MMHG

## 2021-01-20 DIAGNOSIS — Z01.818 ENCOUNTER FOR OTHER PREPROCEDURAL EXAMINATION: ICD-10-CM

## 2021-01-20 DIAGNOSIS — Z98.890 OTHER SPECIFIED POSTPROCEDURAL STATES: Chronic | ICD-10-CM

## 2021-01-20 DIAGNOSIS — Z90.49 ACQUIRED ABSENCE OF OTHER SPECIFIED PARTS OF DIGESTIVE TRACT: Chronic | ICD-10-CM

## 2021-01-20 DIAGNOSIS — Z93.3 COLOSTOMY STATUS: Chronic | ICD-10-CM

## 2021-01-20 DIAGNOSIS — I48.0 PAROXYSMAL ATRIAL FIBRILLATION: ICD-10-CM

## 2021-01-20 DIAGNOSIS — K94.09 OTHER COMPLICATIONS OF COLOSTOMY: Chronic | ICD-10-CM

## 2021-01-20 LAB
ALBUMIN SERPL ELPH-MCNC: 4.5 G/DL — SIGNIFICANT CHANGE UP (ref 3.5–5.2)
ALP SERPL-CCNC: 86 U/L — SIGNIFICANT CHANGE UP (ref 30–115)
ALT FLD-CCNC: 16 U/L — SIGNIFICANT CHANGE UP (ref 0–41)
ANION GAP SERPL CALC-SCNC: 9 MMOL/L — SIGNIFICANT CHANGE UP (ref 7–14)
APTT BLD: 35.7 SEC — SIGNIFICANT CHANGE UP (ref 27–39.2)
AST SERPL-CCNC: 30 U/L — SIGNIFICANT CHANGE UP (ref 0–41)
BASOPHILS # BLD AUTO: 0.05 K/UL — SIGNIFICANT CHANGE UP (ref 0–0.2)
BASOPHILS NFR BLD AUTO: 0.7 % — SIGNIFICANT CHANGE UP (ref 0–1)
BILIRUB SERPL-MCNC: 0.7 MG/DL — SIGNIFICANT CHANGE UP (ref 0.2–1.2)
BUN SERPL-MCNC: 26 MG/DL — HIGH (ref 10–20)
CALCIUM SERPL-MCNC: 9.9 MG/DL — SIGNIFICANT CHANGE UP (ref 8.5–10.1)
CHLORIDE SERPL-SCNC: 104 MMOL/L — SIGNIFICANT CHANGE UP (ref 98–110)
CO2 SERPL-SCNC: 28 MMOL/L — SIGNIFICANT CHANGE UP (ref 17–32)
CREAT SERPL-MCNC: 0.9 MG/DL — SIGNIFICANT CHANGE UP (ref 0.7–1.5)
EOSINOPHIL # BLD AUTO: 0.19 K/UL — SIGNIFICANT CHANGE UP (ref 0–0.7)
EOSINOPHIL NFR BLD AUTO: 2.7 % — SIGNIFICANT CHANGE UP (ref 0–8)
GLUCOSE SERPL-MCNC: 88 MG/DL — SIGNIFICANT CHANGE UP (ref 70–99)
HCT VFR BLD CALC: 42 % — SIGNIFICANT CHANGE UP (ref 37–47)
HGB BLD-MCNC: 13.5 G/DL — SIGNIFICANT CHANGE UP (ref 12–16)
IMM GRANULOCYTES NFR BLD AUTO: 0.1 % — SIGNIFICANT CHANGE UP (ref 0.1–0.3)
INR BLD: 1.28 RATIO — SIGNIFICANT CHANGE UP (ref 0.65–1.3)
LYMPHOCYTES # BLD AUTO: 2.08 K/UL — SIGNIFICANT CHANGE UP (ref 1.2–3.4)
LYMPHOCYTES # BLD AUTO: 29.2 % — SIGNIFICANT CHANGE UP (ref 20.5–51.1)
MCHC RBC-ENTMCNC: 30.5 PG — SIGNIFICANT CHANGE UP (ref 27–31)
MCHC RBC-ENTMCNC: 32.1 G/DL — SIGNIFICANT CHANGE UP (ref 32–37)
MCV RBC AUTO: 95 FL — SIGNIFICANT CHANGE UP (ref 81–99)
MONOCYTES # BLD AUTO: 0.7 K/UL — HIGH (ref 0.1–0.6)
MONOCYTES NFR BLD AUTO: 9.8 % — HIGH (ref 1.7–9.3)
NEUTROPHILS # BLD AUTO: 4.09 K/UL — SIGNIFICANT CHANGE UP (ref 1.4–6.5)
NEUTROPHILS NFR BLD AUTO: 57.5 % — SIGNIFICANT CHANGE UP (ref 42.2–75.2)
NRBC # BLD: 0 /100 WBCS — SIGNIFICANT CHANGE UP (ref 0–0)
PLATELET # BLD AUTO: 186 K/UL — SIGNIFICANT CHANGE UP (ref 130–400)
POTASSIUM SERPL-MCNC: 3.9 MMOL/L — SIGNIFICANT CHANGE UP (ref 3.5–5)
POTASSIUM SERPL-SCNC: 3.9 MMOL/L — SIGNIFICANT CHANGE UP (ref 3.5–5)
PROT SERPL-MCNC: 6.7 G/DL — SIGNIFICANT CHANGE UP (ref 6–8)
PROTHROM AB SERPL-ACNC: 14.7 SEC — HIGH (ref 9.95–12.87)
RBC # BLD: 4.42 M/UL — SIGNIFICANT CHANGE UP (ref 4.2–5.4)
RBC # FLD: 12.1 % — SIGNIFICANT CHANGE UP (ref 11.5–14.5)
SODIUM SERPL-SCNC: 141 MMOL/L — SIGNIFICANT CHANGE UP (ref 135–146)
WBC # BLD: 7.12 K/UL — SIGNIFICANT CHANGE UP (ref 4.8–10.8)
WBC # FLD AUTO: 7.12 K/UL — SIGNIFICANT CHANGE UP (ref 4.8–10.8)

## 2021-01-20 PROCEDURE — 93010 ELECTROCARDIOGRAM REPORT: CPT

## 2021-01-20 PROCEDURE — 71046 X-RAY EXAM CHEST 2 VIEWS: CPT | Mod: 26

## 2021-01-20 NOTE — H&P PST ADULT - HISTORY OF PRESENT ILLNESS
69 yo female presents with hx afib for past few years, pt has been on several different medications, "i want a better quality of life, i am getting an ablation"  pt notices increased/ more frequent sob&heart racing, presently asymptomatic  pt denies any known exposure to COVID-19, denies any S&S  pt instructed re: self quarantine guidelines    Anesthesia Alert  NO--Difficult Airway  NO--History of neck surgery or radiation  NO--Limited ROM of neck  NO--History of Malignant hyperthermia  NO--No personal or family history of Pseudocholinesterase deficiency.  NO--Prior Anesthesia Complication  NO--Latex Allergy  NO--Loose teeth  NO--History of Rheumatoid Arthritis  NO--YONNY  NO--Other_____

## 2021-01-20 NOTE — H&P PST ADULT - NSICDXPASTSURGICALHX_GEN_ALL_CORE_FT
PAST SURGICAL HISTORY:  H/O varicose vein ligation     History of cardioversion     History of colon resection 2009    Retraction of colostomy     Status post Claritza procedure

## 2021-01-20 NOTE — H&P PST ADULT - NSICDXFAMILYHX_GEN_ALL_CORE_FT
FAMILY HISTORY:  Father  Still living? Unknown  Family history of cancer, Age at diagnosis: Age Unknown

## 2021-01-31 ENCOUNTER — LABORATORY RESULT (OUTPATIENT)
Age: 71
End: 2021-01-31

## 2021-01-31 ENCOUNTER — OUTPATIENT (OUTPATIENT)
Dept: OUTPATIENT SERVICES | Facility: HOSPITAL | Age: 71
LOS: 1 days | Discharge: HOME | End: 2021-01-31

## 2021-01-31 DIAGNOSIS — Z93.3 COLOSTOMY STATUS: Chronic | ICD-10-CM

## 2021-01-31 DIAGNOSIS — Z11.59 ENCOUNTER FOR SCREENING FOR OTHER VIRAL DISEASES: ICD-10-CM

## 2021-01-31 DIAGNOSIS — K94.09 OTHER COMPLICATIONS OF COLOSTOMY: Chronic | ICD-10-CM

## 2021-01-31 DIAGNOSIS — Z90.49 ACQUIRED ABSENCE OF OTHER SPECIFIED PARTS OF DIGESTIVE TRACT: Chronic | ICD-10-CM

## 2021-01-31 DIAGNOSIS — Z98.890 OTHER SPECIFIED POSTPROCEDURAL STATES: Chronic | ICD-10-CM

## 2021-01-31 PROBLEM — I48.91 UNSPECIFIED ATRIAL FIBRILLATION: Chronic | Status: ACTIVE | Noted: 2021-01-20

## 2021-01-31 PROBLEM — K57.80 DIVERTICULITIS OF INTESTINE, PART UNSPECIFIED, WITH PERFORATION AND ABSCESS WITHOUT BLEEDING: Chronic | Status: ACTIVE | Noted: 2018-03-02

## 2021-02-03 ENCOUNTER — INPATIENT (INPATIENT)
Facility: HOSPITAL | Age: 71
LOS: 0 days | Discharge: HOME | End: 2021-02-04
Attending: INTERNAL MEDICINE | Admitting: INTERNAL MEDICINE
Payer: MEDICARE

## 2021-02-03 VITALS — WEIGHT: 123.9 LBS | HEIGHT: 66 IN

## 2021-02-03 DIAGNOSIS — K94.09 OTHER COMPLICATIONS OF COLOSTOMY: Chronic | ICD-10-CM

## 2021-02-03 DIAGNOSIS — Z93.3 COLOSTOMY STATUS: Chronic | ICD-10-CM

## 2021-02-03 DIAGNOSIS — Z98.890 OTHER SPECIFIED POSTPROCEDURAL STATES: Chronic | ICD-10-CM

## 2021-02-03 DIAGNOSIS — Z90.49 ACQUIRED ABSENCE OF OTHER SPECIFIED PARTS OF DIGESTIVE TRACT: Chronic | ICD-10-CM

## 2021-02-03 DIAGNOSIS — I48.0 PAROXYSMAL ATRIAL FIBRILLATION: ICD-10-CM

## 2021-02-03 PROCEDURE — 93325 DOPPLER ECHO COLOR FLOW MAPG: CPT | Mod: 26

## 2021-02-03 PROCEDURE — 93655 ICAR CATH ABLTJ DSCRT ARRHYT: CPT

## 2021-02-03 PROCEDURE — 93657 TX L/R ATRIAL FIB ADDL: CPT

## 2021-02-03 PROCEDURE — 93656 COMPRE EP EVAL ABLTJ ATR FIB: CPT

## 2021-02-03 PROCEDURE — 93320 DOPPLER ECHO COMPLETE: CPT | Mod: 26

## 2021-02-03 PROCEDURE — 93623 PRGRMD STIMJ&PACG IV RX NFS: CPT | Mod: 26

## 2021-02-03 PROCEDURE — 93613 INTRACARDIAC EPHYS 3D MAPG: CPT

## 2021-02-03 PROCEDURE — 93312 ECHO TRANSESOPHAGEAL: CPT | Mod: 26

## 2021-02-03 RX ORDER — AMIODARONE HYDROCHLORIDE 400 MG/1
200 TABLET ORAL EVERY 12 HOURS
Refills: 0 | Status: DISCONTINUED | OUTPATIENT
Start: 2021-02-03 | End: 2021-02-04

## 2021-02-03 RX ORDER — AMIODARONE HYDROCHLORIDE 400 MG/1
200 TABLET ORAL DAILY
Refills: 0 | Status: CANCELLED | OUTPATIENT
Start: 2021-02-17 | End: 2021-02-04

## 2021-02-03 RX ORDER — FUROSEMIDE 40 MG
40 TABLET ORAL DAILY
Refills: 0 | Status: DISCONTINUED | OUTPATIENT
Start: 2021-02-03 | End: 2021-02-04

## 2021-02-03 RX ORDER — FAMOTIDINE 10 MG/ML
40 INJECTION INTRAVENOUS
Refills: 0 | Status: DISCONTINUED | OUTPATIENT
Start: 2021-02-04 | End: 2021-02-04

## 2021-02-03 RX ORDER — METOPROLOL TARTRATE 50 MG
50 TABLET ORAL DAILY
Refills: 0 | Status: DISCONTINUED | OUTPATIENT
Start: 2021-02-03 | End: 2021-02-04

## 2021-02-03 RX ORDER — RIVAROXABAN 15 MG-20MG
20 KIT ORAL
Refills: 0 | Status: DISCONTINUED | OUTPATIENT
Start: 2021-02-03 | End: 2021-02-04

## 2021-02-03 RX ORDER — FAMOTIDINE 10 MG/ML
20 INJECTION INTRAVENOUS ONCE
Refills: 0 | Status: COMPLETED | OUTPATIENT
Start: 2021-02-03 | End: 2021-02-03

## 2021-02-03 RX ORDER — PANTOPRAZOLE SODIUM 20 MG/1
40 TABLET, DELAYED RELEASE ORAL ONCE
Refills: 0 | Status: DISCONTINUED | OUTPATIENT
Start: 2021-02-03 | End: 2021-02-03

## 2021-02-03 RX ADMIN — AMIODARONE HYDROCHLORIDE 200 MILLIGRAM(S): 400 TABLET ORAL at 18:46

## 2021-02-03 RX ADMIN — RIVAROXABAN 20 MILLIGRAM(S): KIT at 21:23

## 2021-02-03 RX ADMIN — FAMOTIDINE 20 MILLIGRAM(S): 10 INJECTION INTRAVENOUS at 22:47

## 2021-02-03 NOTE — CHART NOTE - NSCHARTNOTEFT_GEN_A_CORE
I saw and examined patient and I reviewed his chart and blood work. I attest that there has been no clinical change in patient's condition since last assessment documented in H&P, consult, or last office visit.
PACU ANESTHESIA ADMISSION NOTE      Procedure:   Post op diagnosis:      ____  Intubated  TV:______       Rate: ______      FiO2: ______    _x___  Patent Airway    ___x_  Full return of protective reflexes    __x__  Full rcovery from anesthesia / back to baseline   Vitals:   T: 98.5         R:  10                BP:   112/96               Sat: 97                  P: 70      Mental Status:  _x___ Awake   _x____ Alert   _____ Drowsy   _____ Sedated    Nausea/Vomiting:  __x__ NO  ______Yes,   See Post - Op Orders          Pain Scale (0-10):  __x___    Treatment: ____ None    ____ See Post - Op/PCA Orders    Post - Operative Fluids:   ____ Oral   __x__ See Post - Op Orders    Plan: Discharge:   ____Home       _____Floor     _x____Critical Care    _____  Other:_________________    Comments: tolerated procedure well
Electrophysiology Brief Post-Op Note      I have personally seen and examined the patient.  I agree with the history and physical which I have reviewed and noted any changes below.     PRE-OP DIAGNOSIS: persistent atrial fibrillation    POST-OP DIAGNOSIS: persistent atrial fibrillation    PROCEDURE:  RF ablation of AF: Pulmonary Veins Isolation  3D mapping   Posterior wall isolation  CTI isthmus Ablation  Use of ICE  OUMOU    Vascular Access used (using Ultrasound Guidance)  -Right Femoral Vein: 8F  -Left Femoral Vein: 11F 7F  -Right Femoral Artery: none    All sheaths and wires removed and manual pressure applied.    Physician: Pam  Assistant: None    ANESTHESIA TYPE:  [X]General Anesthesia  [  ] Sedation  [X] Local/Regional      CONDITION  [  ] Critical  [  ] Serious  [  ]Fair  [X]Good      SPECIMENS REMOVED (IF APPLICABLE): NONE      IMPLANTS (IF APPLICABLE): NONE      FINDINGS  -Successful Pulmonary Veins Isolation (RF ablation)  -Posterior wall isolation  -CTI isthmus Ablation  -No pericardial effusion on ICE  -ESTIMATED BLOOD LOSS:  15 mL  -Contrast used: 0 cc  -No immediate complications      PLAN OF CARE  -             Amiodarone 200 mg q12h x 2 weeks, then 200 daily  -	Start Xarelto 20 mg qd tonight at 8 pm  -	Bed rest for 4 hours  -	Admit to telemetry

## 2021-02-03 NOTE — PRE-ANESTHESIA EVALUATION ADULT - NSPROPOSEDPROCEDFT_GEN_ALL_CORE
ABLATION Verbalized Understanding/Returned Demonstration/Simple: Patient demonstrates quick and easy understanding

## 2021-02-04 ENCOUNTER — TRANSCRIPTION ENCOUNTER (OUTPATIENT)
Age: 71
End: 2021-02-04

## 2021-02-04 VITALS
RESPIRATION RATE: 18 BRPM | SYSTOLIC BLOOD PRESSURE: 141 MMHG | HEART RATE: 72 BPM | TEMPERATURE: 99 F | DIASTOLIC BLOOD PRESSURE: 70 MMHG

## 2021-02-04 PROCEDURE — 93010 ELECTROCARDIOGRAM REPORT: CPT

## 2021-02-04 PROCEDURE — 99238 HOSP IP/OBS DSCHRG MGMT 30/<: CPT

## 2021-02-04 RX ORDER — FAMOTIDINE 10 MG/ML
1 INJECTION INTRAVENOUS
Qty: 60 | Refills: 0
Start: 2021-02-04 | End: 2021-03-05

## 2021-02-04 RX ORDER — AMIODARONE HYDROCHLORIDE 400 MG/1
1 TABLET ORAL
Qty: 0 | Refills: 0 | DISCHARGE
Start: 2021-02-04

## 2021-02-04 RX ORDER — AMIODARONE HYDROCHLORIDE 400 MG/1
1 TABLET ORAL
Qty: 60 | Refills: 0
Start: 2021-02-04 | End: 2021-03-05

## 2021-02-04 RX ORDER — RIVAROXABAN 15 MG-20MG
1 KIT ORAL
Qty: 0 | Refills: 0 | DISCHARGE

## 2021-02-04 RX ORDER — RIVAROXABAN 15 MG-20MG
1 KIT ORAL
Qty: 30 | Refills: 1
Start: 2021-02-04 | End: 2021-04-04

## 2021-02-04 RX ADMIN — AMIODARONE HYDROCHLORIDE 200 MILLIGRAM(S): 400 TABLET ORAL at 05:08

## 2021-02-04 RX ADMIN — Medication 50 MILLIGRAM(S): at 06:04

## 2021-02-04 RX ADMIN — Medication 40 MILLIGRAM(S): at 05:08

## 2021-02-04 RX ADMIN — Medication 10 MILLIGRAM(S): at 05:08

## 2021-02-04 RX ADMIN — FAMOTIDINE 40 MILLIGRAM(S): 10 INJECTION INTRAVENOUS at 05:08

## 2021-02-04 NOTE — DISCHARGE NOTE PROVIDER - CARE PROVIDER_API CALL
Mario Orozco)  Cardiac Electrophysiology; Cardiovascular Disease; [facility]  [address] [place] [address]  Phone: [phone]  Fax: [phone]  Established Patient  Scheduled Appointment: 03/22/2021 10:45 AM

## 2021-02-04 NOTE — DISCHARGE NOTE PROVIDER - NSDCFUSCHEDAPPT_GEN_ALL_CORE_FT
KARAN LEMONS ; 02/10/2021 ; [facility] [address] KARAN Gómez ; 03/22/2021 ; [facility] [address]

## 2021-02-04 NOTE — DISCHARGE NOTE PROVIDER - HOSPITAL COURSE
This is a 71 y/o female with PMH Afib (Xarelto), perforated diverticulum s/p colostomy and later s/p reversal, who is admitted for AF ablation, now POD #1. No immediate complications noted.

## 2021-02-04 NOTE — DISCHARGE NOTE PROVIDER - PROVIDER TOKENS
PROVIDER:[TOKEN:[[id number]],SCHEDULEDAPPT:[03/22/2021],SCHEDULEDAPPTTIME:[10:45 AM],ESTABLISHEDPATIENT:[T]]

## 2021-02-04 NOTE — PROGRESS NOTE ADULT - SUBJECTIVE AND OBJECTIVE BOX
INTERVAL HPI/OVERNIGHT EVENTS:  Pt is POD #1 s/p AF ablation, no immediate complications noted. In NSR. Tele review noted, 1 episode of NSVT 8 beats. B/L groin sutures removed.   Patient denies fever, chills, dizziness, syncope, chest pain, palpitations, SOB, cough, abd pain, n/v/d/c, dysuria, hematuria or unusual rash.     MEDICATIONS  (STANDING):  aMIOdarone    Tablet 200 milliGRAM(s) Oral every 12 hours  enalapril 10 milliGRAM(s) Oral daily  famotidine    Tablet 40 milliGRAM(s) Oral two times a day  furosemide    Tablet 40 milliGRAM(s) Oral daily  metoprolol succinate ER 50 milliGRAM(s) Oral daily  rivaroxaban 20 milliGRAM(s) Oral with dinner    MEDICATIONS  (PRN):      Allergies    Eliquis (Rash)    Intolerances        REVIEW OF SYSTEMS    A ten-point review of systems was otherwise negative except as noted.  .      Vital Signs Last 24 Hrs  T(C): 37.4 (04 Feb 2021 04:25), Max: 37.4 (03 Feb 2021 20:24)  T(F): 99.3 (04 Feb 2021 04:25), Max: 99.3 (03 Feb 2021 20:24)  HR: 80 (04 Feb 2021 04:25) (72 - 80)  BP: 136/64 (04 Feb 2021 04:25) (136/64 - 148/71)  BP(mean): --  RR: 18 (03 Feb 2021 20:24) (18 - 18)  SpO2: 96% (03 Feb 2021 19:50) (96% - 96%)    02-03-21 @ 07:01  -  02-04-21 @ 07:00  --------------------------------------------------------  IN: 60 mL / OUT: 102 mL / NET: -42 mL        Physical Exam  GENERAL: In no apparent distress, well nourished, and hydrated.  HEART: Regular rate and rhythm; No murmurs, rubs, or gallops.  PULMONARY: Clear to auscultation and perfusion.  No rales, wheezing, or rhonchi bilaterally.  ABDOMEN: Soft, Nontender, Nondistended; Bowel sounds present  EXTREMITIES:  B/L groin c/d/i, no hematoma/drainage noted. 2+ Peripheral Pulses, No clubbing, cyanosis, or edema  NEUROLOGICAL:     LABS:                02-03-21 @ 07:01  -  02-04-21 @ 07:00  --------------------------------------------------------  IN: 60 mL / OUT: 102 mL / NET: -42 mL        02-03-21 @ 07:01  -  02-04-21 @ 07:00  --------------------------------------------------------  IN: 60 mL / OUT: 102 mL / NET: -42 mL        RADIOLOGY & ADDITIONAL TESTS:   INTERVAL HPI/OVERNIGHT EVENTS:  Pt is POD #1 s/p AF ablation, no immediate complications noted. In NSR. Tele review noted, 1 episode of NSVT 8 beats.  C/o skin irritation with adhesive. Patient denies fever, chills, dizziness, syncope, chest pain, palpitations, SOB, cough, abd pain, n/v/d/c, dysuria, hematuria.     MEDICATIONS  (STANDING):  aMIOdarone    Tablet 200 milliGRAM(s) Oral every 12 hours  enalapril 10 milliGRAM(s) Oral daily  famotidine    Tablet 40 milliGRAM(s) Oral two times a day  furosemide    Tablet 40 milliGRAM(s) Oral daily  metoprolol succinate ER 50 milliGRAM(s) Oral daily  rivaroxaban 20 milliGRAM(s) Oral with dinner    MEDICATIONS  (PRN):      Allergies  Eliquis (Rash)      REVIEW OF SYSTEMS    A ten-point review of systems was otherwise negative except as noted.    Vital Signs Last 24 Hrs  T(C): 37.4 (04 Feb 2021 04:25), Max: 37.4 (03 Feb 2021 20:24)  T(F): 99.3 (04 Feb 2021 04:25), Max: 99.3 (03 Feb 2021 20:24)  HR: 80 (04 Feb 2021 04:25) (72 - 80)  BP: 136/64 (04 Feb 2021 04:25) (136/64 - 148/71)  BP(mean): --  RR: 18 (03 Feb 2021 20:24) (18 - 18)  SpO2: 96% (03 Feb 2021 19:50) (96% - 96%)    02-03-21 @ 07:01  -  02-04-21 @ 07:00  --------------------------------------------------------  IN: 60 mL / OUT: 102 mL / NET: -42 mL    Physical Exam  GENERAL: In no apparent distress, well nourished, and hydrated.  HEART: Regular rate and rhythm; No murmurs, rubs, or gallops.  PULMONARY: Clear to auscultation and perfusion.  No rales, wheezing, or rhonchi bilaterally.  ABDOMEN: Soft, Nontender, Nondistended; Bowel sounds present  EXTREMITIES:  B/L groin c/d/i, no hematoma/drainage noted. 2+ Peripheral Pulses, No clubbing, cyanosis, or edema  NEUROLOGICAL: AO x4, MOSS, speech clear.     LABS:    02-03-21 @ 07:01  -  02-04-21 @ 07:00  --------------------------------------------------------  IN: 60 mL / OUT: 102 mL / NET: -42 mL        02-03-21 @ 07:01  -  02-04-21 @ 07:00  --------------------------------------------------------  IN: 60 mL / OUT: 102 mL / NET: -42 mL        RADIOLOGY & ADDITIONAL TESTS:  < from: 12 Lead ECG (02.04.21 @ 07:57) >  Ventricular Rate 68 BPM    Atrial Rate 68 BPM    P-R Interval 176 ms    QRS Duration 108 ms    Q-T Interval 418 ms    QTC Calculation(Bazett) 444 ms    P Axis 65 degrees    R Axis 80 degrees    T Axis 54 degrees    Diagnosis Line Normal sinus rhythm  Nonspecific ST and T wave abnormality  Abnormal ECG    < end of copied text >

## 2021-02-04 NOTE — DISCHARGE NOTE PROVIDER - NSDCCPTREATMENT_GEN_ALL_CORE_FT
PRINCIPAL PROCEDURE  Procedure: Intracardiac catheter ablation for atrial fibrillation  Findings and Treatment: 2/3/2021 by Dr. Medina

## 2021-02-04 NOTE — PROGRESS NOTE ADULT - ASSESSMENT
This is a 71 y/o female with PMH Afib (Xarelto), perforated diverticulum s/p colosotmy and later s/p reversal, who is admitted for AF ablation, now POD #1. No immediate complicatiobs noted.    Plan:        Discharge Instructions:  - Continue Xarelto   - Do NOT stop blood thinners unless discussed with doctor  - Cont Pepcid 20 mg PO Q 12 x 30 days  - No heavy lifting > 10 lbs, squatting, or exertional activities for 5-7days  - Can take a shower starting tomorrow  - No submerging in water for 1 week  - No driving for 5 days  - FU with Dr. Orozco' office on 3/22 at 10:45 AM     Cardiologist: Dr. Rodriguez  EP: Dr. Orozco    This is a 71 y/o female with PMH Afib (Xarelto), perforated diverticulum s/p colostomy and later s/p reversal, who is admitted for AF ablation, now POD #1. No immediate complications noted.    Plan:  - EKG: SR 68 BPM, , QTc 444,   - Cont pepcid x 30 days  - Cont Xarelto  - Cont amio    Dispo: Home today    Discharge Instructions:  - Continue Xarelto   - Do NOT stop blood thinners unless discussed with doctor  - Cont Pepcid 20 mg PO Q 12 x 30 days  - No heavy lifting > 10 lbs, squatting, or exertional activities for 5-7days  - Can take a shower starting tomorrow  - No submerging in water for 1 week  - No driving for 5 days  - FU with Dr. Orozco' office on 3/22 at 10:45 AM

## 2021-02-04 NOTE — DISCHARGE NOTE NURSING/CASE MANAGEMENT/SOCIAL WORK - PATIENT PORTAL LINK FT
You can access the FollowMyHealth Patient Portal offered by [facility] by registering at the following website: [email or web address]. By joining "[employer]"’s FollowMyHealth portal, you will also be able to view your health information using other applications (apps) compatible with our system.

## 2021-02-04 NOTE — DISCHARGE NOTE PROVIDER - NSDCMRMEDTOKEN_GEN_ALL_CORE_FT
amiodarone 200 mg oral tablet: 1 tab(s) orally once a day  amiodarone 200 mg oral tablet: 1 tab(s) orally every 12 hours MDD:2  enalapril 10 mg oral tablet: 1 tab(s) orally once a day  famotidine 40 mg oral tablet: 1 tab(s) orally 2 times a day MDD:2  furosemide 40 mg oral tablet: 1 tab(s) orally once a day  Metoprolol Succinate ER 50 mg oral tablet, extended release: 1 tab(s) orally 2 times a day  Xarelto 20 mg oral tablet: 1 tab(s) orally once a day (in the evening) MDD:1

## 2021-02-04 NOTE — DISCHARGE NOTE PROVIDER - NSDCFUADDINST_GEN_ALL_CORE_FT
- Continue Xarelto   - Do NOT stop blood thinners unless discussed with doctor  - Please take Amiodarone twice a day for 2 weeks (today through 2/18), starting 2/19 please take amiodarone once a day  - Cont Pepcid 40 mg PO Q 12 x 30 days  - No heavy lifting > 10 lbs, squatting, or exertional activities for 5-7days  - Can take a shower starting tomorrow  - No submerging in water for 1 week  - No driving for 5 days

## 2021-02-08 DIAGNOSIS — I48.19 OTHER PERSISTENT ATRIAL FIBRILLATION: ICD-10-CM

## 2021-02-08 DIAGNOSIS — Z79.01 LONG TERM (CURRENT) USE OF ANTICOAGULANTS: ICD-10-CM

## 2021-02-08 DIAGNOSIS — Z88.8 ALLERGY STATUS TO OTHER DRUGS, MEDICAMENTS AND BIOLOGICAL SUBSTANCES: ICD-10-CM

## 2021-02-08 DIAGNOSIS — I47.2 VENTRICULAR TACHYCARDIA: ICD-10-CM

## 2021-02-08 DIAGNOSIS — Q82.8 OTHER SPECIFIED CONGENITAL MALFORMATIONS OF SKIN: ICD-10-CM

## 2021-02-08 DIAGNOSIS — I42.9 CARDIOMYOPATHY, UNSPECIFIED: ICD-10-CM

## 2021-02-08 DIAGNOSIS — I50.9 HEART FAILURE, UNSPECIFIED: ICD-10-CM

## 2021-02-08 DIAGNOSIS — I11.0 HYPERTENSIVE HEART DISEASE WITH HEART FAILURE: ICD-10-CM

## 2021-02-08 DIAGNOSIS — Z87.19 PERSONAL HISTORY OF OTHER DISEASES OF THE DIGESTIVE SYSTEM: ICD-10-CM

## 2021-02-08 DIAGNOSIS — Z90.49 ACQUIRED ABSENCE OF OTHER SPECIFIED PARTS OF DIGESTIVE TRACT: ICD-10-CM

## 2021-02-08 DIAGNOSIS — I48.92 UNSPECIFIED ATRIAL FLUTTER: ICD-10-CM

## 2021-02-10 ENCOUNTER — APPOINTMENT (OUTPATIENT)
Dept: CARDIOLOGY | Facility: CLINIC | Age: 71
End: 2021-02-10
Payer: MEDICARE

## 2021-02-10 VITALS
BODY MASS INDEX: 20.89 KG/M2 | TEMPERATURE: 97.3 F | HEART RATE: 63 BPM | HEIGHT: 66 IN | DIASTOLIC BLOOD PRESSURE: 88 MMHG | SYSTOLIC BLOOD PRESSURE: 132 MMHG | WEIGHT: 130 LBS

## 2021-02-10 PROCEDURE — 99072 ADDL SUPL MATRL&STAF TM PHE: CPT

## 2021-02-10 PROCEDURE — 93000 ELECTROCARDIOGRAM COMPLETE: CPT

## 2021-02-10 PROCEDURE — 99214 OFFICE O/P EST MOD 30 MIN: CPT

## 2021-02-10 NOTE — PHYSICAL EXAM
[General Appearance - Well Developed] : well developed [Normal Appearance] : normal appearance [Well Groomed] : well groomed [General Appearance - Well Nourished] : well nourished [General Appearance - In No Acute Distress] : no acute distress [Normal Oral Mucosa] : normal oral mucosa [No Oral Pallor] : no oral pallor [No Oral Cyanosis] : no oral cyanosis [Normal Jugular Venous A Waves Present] : normal jugular venous A waves present [Normal Jugular Venous V Waves Present] : normal jugular venous V waves present [No Jugular Venous Pearl A Waves] : no jugular venous pearl A waves [Respiration, Rhythm And Depth] : normal respiratory rhythm and effort [Exaggerated Use Of Accessory Muscles For Inspiration] : no accessory muscle use [Auscultation Breath Sounds / Voice Sounds] : lungs were clear to auscultation bilaterally [Heart Rate And Rhythm] : heart rate and rhythm were normal [Heart Sounds] : normal S1 and S2 [Systolic grade ___/6] : A grade [unfilled]/6 systolic murmur was heard. [Abdomen Soft] : soft [Abdomen Tenderness] : non-tender [Abdomen Mass (___ Cm)] : no abdominal mass palpated [Nail Clubbing] : no clubbing of the fingernails [Cyanosis, Localized] : no localized cyanosis [Petechial Hemorrhages (___cm)] : no petechial hemorrhages [Skin Color & Pigmentation] : normal skin color and pigmentation [] : no rash [No Venous Stasis] : no venous stasis [Skin Lesions] : no skin lesions [No Skin Ulcers] : no skin ulcer [No Xanthoma] : no  xanthoma was observed [Oriented To Time, Place, And Person] : oriented to person, place, and time

## 2021-02-10 NOTE — HISTORY OF PRESENT ILLNESS
[FreeTextEntry1] : 71 yo female with pmhx as below\par 2018 admission to [facility] with adhf and a. fib\par cardioverted and sent home on med rx for hf\par 4/18  - afib with rvr, stopped amio, bb and eliquis\par decided against ep consult\par 4/20 visit to ed with chf, started on ccb and sent home with card f/up\par on ccb and lasix, symptoms much better\par 2020 repeat echo - severe lv dysfnx with MR, ccb was disc-d and started on bb\par hr 70-110s last few weeks\par 2/21 - s/p a.fib ablation\par no long, orthopnea, pnd, cp, on and off le edema\par compliant with meds\par et is stable\par ros is otherwise as below

## 2021-02-10 NOTE — ASSESSMENT
[FreeTextEntry1] : 71 yo female with pmhx and presentation as above\par CHF\par A.fib/HTN\par cont meds\par cont bb  50\par take  lasix as needed, monitor for s/s of fluid overload\par avoid salt \par long term a/c discussed, cont  xarelto\par aggressive risk modif\par low salt diet and act as tolerated\par repeat echo 10-12 weeks\par RTC 3-4 months\par

## 2021-02-10 NOTE — REVIEW OF SYSTEMS
[see HPI] : see HPI [Feeling Fatigued] : feeling fatigued [Joint Pain] : joint pain [Negative] : Endocrine

## 2021-03-02 ENCOUNTER — NON-APPOINTMENT (OUTPATIENT)
Age: 71
End: 2021-03-02

## 2021-03-22 ENCOUNTER — APPOINTMENT (OUTPATIENT)
Dept: CARDIOLOGY | Facility: CLINIC | Age: 71
End: 2021-03-22
Payer: MEDICARE

## 2021-03-22 VITALS
TEMPERATURE: 97.9 F | HEIGHT: 66 IN | BODY MASS INDEX: 21.38 KG/M2 | DIASTOLIC BLOOD PRESSURE: 93 MMHG | SYSTOLIC BLOOD PRESSURE: 225 MMHG | HEART RATE: 53 BPM | WEIGHT: 133 LBS

## 2021-03-22 PROCEDURE — 93000 ELECTROCARDIOGRAM COMPLETE: CPT

## 2021-03-22 PROCEDURE — 99072 ADDL SUPL MATRL&STAF TM PHE: CPT

## 2021-03-22 PROCEDURE — 99214 OFFICE O/P EST MOD 30 MIN: CPT

## 2021-03-22 RX ORDER — FAMOTIDINE 40 MG/1
40 TABLET, FILM COATED ORAL TWICE DAILY
Qty: 30 | Refills: 5 | Status: COMPLETED | COMMUNITY
End: 2021-03-22

## 2021-03-22 RX ORDER — AMIODARONE HYDROCHLORIDE 200 MG/1
200 TABLET ORAL TWICE DAILY
Refills: 0 | Status: COMPLETED | COMMUNITY
End: 2021-03-22

## 2021-03-22 NOTE — PHYSICAL EXAM
[General Appearance - Well Developed] : well developed [Normal Appearance] : normal appearance [Well Groomed] : well groomed [General Appearance - Well Nourished] : well nourished [No Deformities] : no deformities [General Appearance - In No Acute Distress] : no acute distress [Normal Conjunctiva] : the conjunctiva exhibited no abnormalities [Eyelids - No Xanthelasma] : the eyelids demonstrated no xanthelasmas [Normal Oral Mucosa] : normal oral mucosa [No Oral Pallor] : no oral pallor [No Oral Cyanosis] : no oral cyanosis [Normal Jugular Venous A Waves Present] : normal jugular venous A waves present [Normal Jugular Venous V Waves Present] : normal jugular venous V waves present [No Jugular Venous Pearl A Waves] : no jugular venous pearl A waves [Respiration, Rhythm And Depth] : normal respiratory rhythm and effort [Exaggerated Use Of Accessory Muscles For Inspiration] : no accessory muscle use [Auscultation Breath Sounds / Voice Sounds] : lungs were clear to auscultation bilaterally [Heart Rate And Rhythm] : heart rate and rhythm were normal [Heart Sounds] : normal S1 and S2 [Murmurs] : no murmurs present [Abdomen Soft] : soft [Abdomen Tenderness] : non-tender [Abdomen Mass (___ Cm)] : no abdominal mass palpated [Abnormal Walk] : normal gait [Gait - Sufficient For Exercise Testing] : the gait was sufficient for exercise testing [Nail Clubbing] : no clubbing of the fingernails [Cyanosis, Localized] : no localized cyanosis [Petechial Hemorrhages (___cm)] : no petechial hemorrhages [Skin Color & Pigmentation] : normal skin color and pigmentation [] : no rash [No Venous Stasis] : no venous stasis [Skin Lesions] : no skin lesions [No Skin Ulcers] : no skin ulcer [No Xanthoma] : no  xanthoma was observed [Oriented To Time, Place, And Person] : oriented to person, place, and time [Affect] : the affect was normal [Mood] : the mood was normal [No Anxiety] : not feeling anxious

## 2021-03-30 RX ORDER — HYDRALAZINE HYDROCHLORIDE 25 MG/1
25 TABLET ORAL 3 TIMES DAILY
Qty: 90 | Refills: 0 | Status: DISCONTINUED | COMMUNITY
Start: 2021-03-22 | End: 2021-03-30

## 2021-04-19 ENCOUNTER — NON-APPOINTMENT (OUTPATIENT)
Age: 71
End: 2021-04-19

## 2021-05-13 ENCOUNTER — APPOINTMENT (OUTPATIENT)
Dept: CARDIOLOGY | Facility: CLINIC | Age: 71
End: 2021-05-13
Payer: MEDICARE

## 2021-05-13 PROCEDURE — 93306 TTE W/DOPPLER COMPLETE: CPT

## 2021-05-13 PROCEDURE — 99072 ADDL SUPL MATRL&STAF TM PHE: CPT

## 2021-05-15 ENCOUNTER — TRANSCRIPTION ENCOUNTER (OUTPATIENT)
Age: 71
End: 2021-05-15

## 2021-05-25 ENCOUNTER — APPOINTMENT (OUTPATIENT)
Dept: CARDIOLOGY | Facility: CLINIC | Age: 71
End: 2021-05-25
Payer: MEDICARE

## 2021-05-25 VITALS
HEIGHT: 66 IN | SYSTOLIC BLOOD PRESSURE: 180 MMHG | DIASTOLIC BLOOD PRESSURE: 80 MMHG | TEMPERATURE: 97.3 F | BODY MASS INDEX: 20.89 KG/M2 | WEIGHT: 130 LBS | HEART RATE: 41 BPM | OXYGEN SATURATION: 99 %

## 2021-05-25 DIAGNOSIS — Z00.00 ENCOUNTER FOR GENERAL ADULT MEDICAL EXAMINATION W/OUT ABNORMAL FINDINGS: ICD-10-CM

## 2021-05-25 PROCEDURE — 93000 ELECTROCARDIOGRAM COMPLETE: CPT

## 2021-05-25 PROCEDURE — 99214 OFFICE O/P EST MOD 30 MIN: CPT

## 2021-05-25 PROCEDURE — 99072 ADDL SUPL MATRL&STAF TM PHE: CPT

## 2021-05-25 NOTE — HISTORY OF PRESENT ILLNESS
[FreeTextEntry1] : Referring Physician: Dr. Candido Rodriguez\par \par March 2018: diagnosed with new-onset atrial fibrillation and atrial flutter. underwent OUMOU/DCCV unsuccessful, followed by conversion to sinus on Amiodarone. took Amiodarone for ~2 months.\par EF was low when in AF with RVR. improved after DC cardioversion\par Had severe skin reaction to Eliquis; followed by skin bleeding and discontinuation of DOAC\par September 2019: recurrence of AF with RVR and worsening EF. treated with rate control.\par EF worsened.\par She underwent AF ablation on 2/3/2021\par She has no AF since ablation.\par EF improved to 59% on 5/13/2021\par doing stationary bike 20 minutes daily\par She has no chest pain, no shortness of breath at rest, no orthopnea, no PND. She denies dizziness, lightheadedness and syncope. She has mild exertional symptoms. She presents for evaluation.\par \par \par

## 2021-05-25 NOTE — CARDIOLOGY SUMMARY
[de-identified] : ECG (5/25/2021): sinus rhythm at 41 bpm, QTc 440, non-sp T wave abnormalities\par ECG (3/22/2021): sinus rhythm at 59 bpm, non-sp T wave abnormalities\par ECG (12/8/2020): Atrial Fibrillation at 118 bpm, non-sp T wave abnormalities. [de-identified] : Echo (5/13/2021): EF 59%, LA severely dilated. \par Echo (9/23/2020): EF 25-30%; RA mod dilated, LA severely dilated.  [de-identified] : Catheter Ablation (2/3/2021): RF ablation of AF: PVI + posterior wall isolation + CTI ablation \par

## 2021-05-25 NOTE — REASON FOR VISIT
[Arrhythmia/ECG Abnorrmalities] : arrhythmia/ECG abnormalities [FreeTextEntry3] : Dr. Candido Rodriguez

## 2021-05-25 NOTE — DISCUSSION/SUMMARY
[FreeTextEntry1] : Ms. 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 Her CHADSVASC score is 4 and she is on anticoagulation due to cutaneous bleed. She takes aspirin intermittently. AF started in March 2018, s/p DCCV and Amiodarone therapy for ~2 months. AF recurred in September 2019. Her EF worsens when in AF. She underwent successful RF ablation of atrial fibrillation on 2/3/2021 (PVI + posterior wall isolation + CTI isthmus ablation). EF improved from 25-30% to 59% on echo from 5/13/2021\par \par I recommend to reducee Amiodarone at 100 mg daily. Amiodarone was started after ablation on 2/3/2021 at 200 mg daily with 2 weeks interruption, and it was reduced to 100 mg daily on 5/25/2021. Plan to reduce to 100 mg qod on next visit.\par \par I recommend to continue Toprol at 50 mg daily \par \par BP uncontrolled today. Patient has no symptoms; not interested in ER visit. I discussed with her low salt diet. I educated patient and son to create BP log and document BP every am. I recommend f/u with PCP in one week to assess BP response to medications. BP log scanned - BP mostly controlled; diastolic at target; systolic elevated at times.\par \par Patient has no arrhythmias since the catheter ablation. There are no groin hematomas or bleeding. Patient is pleased with results of catheter ablation although is aware with the risk of recurrent symptoms and need for another ablation. Post ablation care was discussed in great length. I discussed with patient contacting me in case of any symptoms suggestive of recurrence.\par \par I discussed with patient plan of care in great details. I answered all her questions to her satisfaction. Patient was pleased with the visit.\par \par Patient will follow with me in 6 months’ time. Please do not hesitate to contact me at [phone] if you have any further questions regarding this patient care.\par

## 2021-05-29 LAB
ALBUMIN SERPL ELPH-MCNC: 4.9 G/DL
ALP BLD-CCNC: 99 U/L
ALT SERPL-CCNC: 20 U/L
ANION GAP SERPL CALC-SCNC: 16 MMOL/L
APTT BLD: 47.4 SEC
AST SERPL-CCNC: 26 U/L
BASOPHILS # BLD AUTO: 0.08 K/UL
BASOPHILS NFR BLD AUTO: 0.8 %
BILIRUB DIRECT SERPL-MCNC: <0.2 MG/DL
BILIRUB INDIRECT SERPL-MCNC: NORMAL MG/DL
BILIRUB SERPL-MCNC: 0.9 MG/DL
BUN SERPL-MCNC: 26 MG/DL
CALCIUM SERPL-MCNC: 10 MG/DL
CHLORIDE SERPL-SCNC: 99 MMOL/L
CO2 SERPL-SCNC: 27 MMOL/L
CREAT SERPL-MCNC: 1.2 MG/DL
EOSINOPHIL # BLD AUTO: 0.1 K/UL
EOSINOPHIL NFR BLD AUTO: 1 %
GLUCOSE SERPL-MCNC: 98 MG/DL
HCT VFR BLD CALC: 42.8 %
HGB BLD-MCNC: 14.5 G/DL
IMM GRANULOCYTES NFR BLD AUTO: 0.7 %
INR PPP: 2.43 RATIO
LYMPHOCYTES # BLD AUTO: 1.4 K/UL
LYMPHOCYTES NFR BLD AUTO: 14.2 %
MAN DIFF?: NORMAL
MCHC RBC-ENTMCNC: 32.1 PG
MCHC RBC-ENTMCNC: 33.9 G/DL
MCV RBC AUTO: 94.7 FL
MONOCYTES # BLD AUTO: 0.97 K/UL
MONOCYTES NFR BLD AUTO: 9.8 %
NEUTROPHILS # BLD AUTO: 7.23 K/UL
NEUTROPHILS NFR BLD AUTO: 73.5 %
PLATELET # BLD AUTO: 214 K/UL
POTASSIUM SERPL-SCNC: 3.8 MMOL/L
PROT SERPL-MCNC: 7.6 G/DL
PT BLD: 28 SEC
RBC # BLD: 4.52 M/UL
RBC # FLD: 12.7 %
SODIUM SERPL-SCNC: 142 MMOL/L
T4 FREE SERPL-MCNC: 1.5 NG/DL
T4 SERPL-MCNC: 9.5 UG/DL
TSH SERPL-ACNC: 4.36 UIU/ML
WBC # FLD AUTO: 9.85 K/UL

## 2021-06-11 ENCOUNTER — APPOINTMENT (OUTPATIENT)
Dept: CARDIOLOGY | Facility: CLINIC | Age: 71
End: 2021-06-11
Payer: MEDICARE

## 2021-06-11 VITALS
HEART RATE: 50 BPM | SYSTOLIC BLOOD PRESSURE: 168 MMHG | DIASTOLIC BLOOD PRESSURE: 82 MMHG | BODY MASS INDEX: 21.86 KG/M2 | WEIGHT: 136 LBS | TEMPERATURE: 97.4 F | HEIGHT: 66 IN

## 2021-06-11 PROCEDURE — 93000 ELECTROCARDIOGRAM COMPLETE: CPT

## 2021-06-11 PROCEDURE — 99072 ADDL SUPL MATRL&STAF TM PHE: CPT

## 2021-06-11 PROCEDURE — 99214 OFFICE O/P EST MOD 30 MIN: CPT

## 2021-06-11 NOTE — HISTORY OF PRESENT ILLNESS
[FreeTextEntry1] : 70 yo female with pmhx as below\par 2018 admission to [facility] with adhf and a. fib\par cardioverted and sent home on med rx for hf\par 4/18  - afib with rvr, stopped amio, bb and eliquis\par decided against ep consult\par 4/20 visit to ed with chf, started on ccb and sent home with card f/up\par on ccb and lasix, symptoms much better\par 2020 repeat echo - severe lv dysfnx with MR, ccb was disc-d and started on bb\par 2/21 - s/p a.fib ablation\par repeat echo - ef back to nl, pulm htn\par + fatigue\par no long, orthopnea, pnd, cp, le edema better\par compliant with meds\par et is stable\par ros is otherwise as below

## 2021-06-11 NOTE — REVIEW OF SYSTEMS
[Feeling Fatigued] : feeling fatigued [Skin Lesions] : skin lesion(s): [Negative] : Heme/Lymph [FreeTextEntry5] : see hpi

## 2021-06-11 NOTE — PHYSICAL EXAM

## 2021-06-11 NOTE — REASON FOR VISIT
[Cardiac Failure] : cardiac failure [Arrhythmia/ECG Abnorrmalities] : arrhythmia/ECG abnormalities [Hyperlipidemia] : hyperlipidemia [Hypertension] : hypertension

## 2021-07-05 ENCOUNTER — RX RENEWAL (OUTPATIENT)
Age: 71
End: 2021-07-05

## 2021-07-27 ENCOUNTER — APPOINTMENT (OUTPATIENT)
Age: 71
End: 2021-07-27

## 2021-08-25 ENCOUNTER — APPOINTMENT (OUTPATIENT)
Dept: CARDIOLOGY | Facility: CLINIC | Age: 71
End: 2021-08-25

## 2021-11-23 ENCOUNTER — APPOINTMENT (OUTPATIENT)
Dept: CARDIOLOGY | Facility: CLINIC | Age: 71
End: 2021-11-23
Payer: MEDICARE

## 2021-11-23 VITALS
RESPIRATION RATE: 16 BRPM | TEMPERATURE: 98 F | BODY MASS INDEX: 20.09 KG/M2 | SYSTOLIC BLOOD PRESSURE: 170 MMHG | WEIGHT: 125 LBS | HEART RATE: 50 BPM | DIASTOLIC BLOOD PRESSURE: 90 MMHG | HEIGHT: 66 IN

## 2021-11-23 DIAGNOSIS — I48.92 UNSPECIFIED ATRIAL FLUTTER: ICD-10-CM

## 2021-11-23 DIAGNOSIS — I48.19 OTHER PERSISTENT ATRIAL FIBRILLATION: ICD-10-CM

## 2021-11-23 DIAGNOSIS — I42.9 CARDIOMYOPATHY, UNSPECIFIED: ICD-10-CM

## 2021-11-23 DIAGNOSIS — I10 ESSENTIAL (PRIMARY) HYPERTENSION: ICD-10-CM

## 2021-11-23 DIAGNOSIS — I50.20 UNSPECIFIED SYSTOLIC (CONGESTIVE) HEART FAILURE: ICD-10-CM

## 2021-11-23 PROCEDURE — 99214 OFFICE O/P EST MOD 30 MIN: CPT

## 2021-11-23 PROCEDURE — 93000 ELECTROCARDIOGRAM COMPLETE: CPT

## 2021-11-23 RX ORDER — FUROSEMIDE 40 MG/1
40 TABLET ORAL DAILY
Refills: 0 | Status: DISCONTINUED | COMMUNITY
End: 2021-11-23

## 2021-11-23 RX ORDER — CLOBETASOL PROPIONATE 0.5 MG/G
0.05 CREAM TOPICAL DAILY
Refills: 0 | Status: DISCONTINUED | COMMUNITY
Start: 2019-08-28 | End: 2021-11-23

## 2021-11-23 RX ORDER — RIVAROXABAN 20 MG/1
20 TABLET, FILM COATED ORAL DAILY
Qty: 30 | Refills: 3 | Status: DISCONTINUED | COMMUNITY
Start: 2020-12-08 | End: 2021-11-23

## 2021-11-23 NOTE — CARDIOLOGY SUMMARY
[de-identified] : ECG (11/23/2021): sinus rhythm at 49 bpm, QTc 426, non-sp T wave abnormalities\par ECG (5/25/2021): sinus rhythm at 41 bpm, QTc 440, non-sp T wave abnormalities\par ECG (3/22/2021): sinus rhythm at 59 bpm, non-sp T wave abnormalities\par ECG (12/8/2020): Atrial Fibrillation at 118 bpm, non-sp T wave abnormalities. [de-identified] : Echo (5/13/2021): EF 59%, LA severely dilated. \par Echo (9/23/2020): EF 25-30%; RA mod dilated, LA severely dilated.  [de-identified] : Catheter Ablation (2/3/2021): RF ablation of AF: PVI + posterior wall isolation + CTI ablation \par

## 2021-11-23 NOTE — HISTORY OF PRESENT ILLNESS
[FreeTextEntry1] : Referring Physician: Dr. Candido Rodriguez\par \par March 2018: diagnosed with new-onset atrial fibrillation and atrial flutter. underwent OUMOU/DCCV unsuccessful, followed by conversion to sinus on Amiodarone. took Amiodarone for ~2 months.\par EF was low when in AF with RVR. improved after DC cardioversion\par Had severe skin reaction to Eliquis; followed by skin bleeding and discontinuation of DOAC\par September 2019: recurrence of AF with RVR and worsening EF. treated with rate control.\par EF worsened.\par She underwent AF ablation on 2/3/2021\par She has no AF since ablation.\par EF improved to 59% on 5/13/2021\par doing stationary bike 20 minutes daily\par Xarelto stopped last month\par She has no chest pain, no shortness of breath at rest, no orthopnea, no PND. She denies dizziness, lightheadedness and syncope. She has mild exertional symptoms. She presents for evaluation.\par \par \par

## 2021-11-23 NOTE — DISCUSSION/SUMMARY
[FreeTextEntry1] : Ms. 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 Her CHADSVASC score is 4 and she is on anticoagulation due to cutaneous bleed. She takes aspirin intermittently. AF started in March 2018, s/p DCCV and Amiodarone therapy for ~2 months. AF recurred in September 2019. Her EF worsens when in AF. She underwent successful RF ablation of atrial fibrillation on 2/3/2021 (PVI + posterior wall isolation + CTI isthmus ablation). EF improved from 25-30% to 59% on echo from 5/13/2021. Patient stopped Xarelto last month. \par \par I recommend to restart Xarelto at 15 mg daily (Cr Cl 39; Age 71, W 125-130 LBS, Cr 1.2)\par \par I recommend to reduce Amiodarone at 100 mg daily. Amiodarone was started after ablation on 2/3/2021 at 200 mg daily with 2 weeks interruption, and it was reduced to 100 mg daily on 5/25/2021. On 11/23/2021 I recommend to reduce Amiodarone 100 mg every other day; then stop it in May 2021. \par \par I recommend to continue Toprol at 50 mg daily \par \par BP elevated today. BP log from home shows BP mostly controlled 130-140 and Diastolic 60-80; Patient has no symptoms; I discussed with her low salt diet. I educated patient and son to keep BP log and document BP every am. I recommend f/u with PCP in one week to assess BP response to medications. BP log scanned - BP mostly controlled; diastolic at target; systolic elevated at times.\par \par Patient has no arrhythmias since the catheter ablation. There are no groin hematomas or bleeding. Patient is pleased with results of catheter ablation although is aware with the risk of recurrent symptoms and need for another ablation. Post ablation care was discussed in great length. I discussed with patient contacting me in case of any symptoms suggestive of recurrence.\par \par I discussed with patient plan of care in great details. I answered all her questions to her satisfaction. Patient was pleased with the visit.\par \par Patient will follow with me in 6 months’ time; She is planned to move to [place] in Feb 2022. Please do not hesitate to contact me at [phone] if you have any further questions regarding this patient care.\par

## 2021-11-29 ENCOUNTER — RX RENEWAL (OUTPATIENT)
Age: 71
End: 2021-11-29

## 2022-01-07 ENCOUNTER — APPOINTMENT (OUTPATIENT)
Dept: CARDIOLOGY | Facility: CLINIC | Age: 72
End: 2022-01-07

## 2023-02-24 RX ORDER — FUROSEMIDE 40 MG/1
40 TABLET ORAL
Qty: 90 | Refills: 3 | Status: ACTIVE | COMMUNITY
Start: 2020-04-07 | End: 1900-01-01

## 2023-02-24 RX ORDER — RIVAROXABAN 15 MG/1
15 TABLET, FILM COATED ORAL
Qty: 90 | Refills: 2 | Status: ACTIVE | COMMUNITY
Start: 2021-11-23 | End: 1900-01-01

## 2023-02-24 RX ORDER — AMIODARONE HYDROCHLORIDE 100 MG/1
100 TABLET ORAL
Qty: 45 | Refills: 3 | Status: ACTIVE | COMMUNITY
Start: 1900-01-01 | End: 1900-01-01

## 2023-02-24 RX ORDER — ENALAPRIL MALEATE 20 MG/1
20 TABLET ORAL TWICE DAILY
Qty: 180 | Refills: 2 | Status: ACTIVE | COMMUNITY
Start: 2019-04-12 | End: 1900-01-01

## 2023-02-24 RX ORDER — METOPROLOL SUCCINATE 50 MG/1
50 TABLET, EXTENDED RELEASE ORAL
Qty: 180 | Refills: 3 | Status: ACTIVE | COMMUNITY
Start: 2020-11-24 | End: 1900-01-01

## 2024-05-31 NOTE — H&P PST ADULT - CONSTITUTIONAL
Hpi Title: Evaluation of Skin Lesions
Additional History: Est pt to [facility] here for annual FBSE\\nHx DN\\nNo spots of concern
Well-developed, well nourished

## 2024-06-03 NOTE — H&P ADULT - REASON FOR ADMISSION
Cryoablation with liver biopsy performed by Dr. Hutchins.  Tissue sample sent to pathology.  Resulted requested to be sent to Dr. Harika Medina, Dr.Jacquelyn Israel, Dr. Fanta Bell and Dr. Rosi Hutchins.  Anesthesia with pt during the procedure.  Pt tolerated weil.  Will be transferred to PACU. Four our bed rest starting at 1300.  
abd pain and leg swelling

## 2024-08-14 NOTE — ASSESSMENT
Chief Complaint   Patient presents with    3 Month Follow-Up        \"Have you been to the ER, urgent care clinic since your last visit?  Hospitalized since your last visit?\"    NO    “Have you seen or consulted any other health care providers outside of [facility] since your last visit?”    [facility] 7/15/24             Click Here for Release of Records Request     Health Maintenance Due   Topic Date Due    Pneumococcal 0-64 years Vaccine (1 of 2 - PCV) Never done    HIV screen  Never done    Diabetic retinal exam  Never done    Shingles vaccine (1 of 2) Never done    COVID-19 Vaccine (4 - 2023-24 season) 09/01/2023    Hepatitis B vaccine (3 of 3 - 19+ 3-dose series) 10/18/2023    Flu vaccine (1) Never done    Lipids  07/24/2024        [FreeTextEntry1] : 72 yo female with pmhx and presentation as above\par CHF\par A.fib/HTN\par cont meds\par bp logs from home\par cont bb  50\par take  lasix as needed, monitor for s/s of fluid overload\par avoid salt \par long term a/c discussed, cont  xarelto\par aggressive risk modif\par low salt diet and act as tolerated\par repeat echo reviewed, ef back to nl\par pulm eval for phtn\par RTC 3 months\par
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
